# Patient Record
Sex: MALE | Race: WHITE | Employment: FULL TIME | URBAN - NONMETROPOLITAN AREA
[De-identification: names, ages, dates, MRNs, and addresses within clinical notes are randomized per-mention and may not be internally consistent; named-entity substitution may affect disease eponyms.]

---

## 2019-03-15 DIAGNOSIS — I24.9 ACUTE CORONARY SYNDROME (HCC): ICD-10-CM

## 2019-03-15 DIAGNOSIS — I21.02 ST ELEVATION MYOCARDIAL INFARCTION INVOLVING LEFT ANTERIOR DESCENDING (LAD) CORONARY ARTERY (HCC): ICD-10-CM

## 2019-03-15 DIAGNOSIS — I46.9 CARDIAC ARREST (HCC): ICD-10-CM

## 2019-03-16 RX ORDER — CARVEDILOL 6.25 MG/1
TABLET ORAL
Qty: 30 TABLET | Refills: 0 | Status: SHIPPED | OUTPATIENT
Start: 2019-03-16 | End: 2019-04-16 | Stop reason: SDUPTHER

## 2019-04-16 ENCOUNTER — OFFICE VISIT (OUTPATIENT)
Dept: CARDIOLOGY CLINIC | Age: 54
End: 2019-04-16
Payer: COMMERCIAL

## 2019-04-16 VITALS
HEIGHT: 71 IN | WEIGHT: 238 LBS | RESPIRATION RATE: 18 BRPM | BODY MASS INDEX: 33.32 KG/M2 | DIASTOLIC BLOOD PRESSURE: 78 MMHG | HEART RATE: 79 BPM | SYSTOLIC BLOOD PRESSURE: 118 MMHG

## 2019-04-16 DIAGNOSIS — I46.9 CARDIAC ARREST (HCC): ICD-10-CM

## 2019-04-16 DIAGNOSIS — I21.02 ST ELEVATION MYOCARDIAL INFARCTION INVOLVING LEFT ANTERIOR DESCENDING (LAD) CORONARY ARTERY (HCC): ICD-10-CM

## 2019-04-16 DIAGNOSIS — I24.9 ACUTE CORONARY SYNDROME (HCC): Primary | ICD-10-CM

## 2019-04-16 PROCEDURE — 93000 ELECTROCARDIOGRAM COMPLETE: CPT | Performed by: INTERNAL MEDICINE

## 2019-04-16 PROCEDURE — 99214 OFFICE O/P EST MOD 30 MIN: CPT | Performed by: INTERNAL MEDICINE

## 2019-04-16 RX ORDER — CARVEDILOL 6.25 MG/1
TABLET ORAL
Qty: 30 TABLET | Refills: 0 | Status: SHIPPED | OUTPATIENT
Start: 2019-04-16 | End: 2020-01-17 | Stop reason: SDUPTHER

## 2019-04-16 RX ORDER — ATORVASTATIN CALCIUM 80 MG/1
TABLET, FILM COATED ORAL
Qty: 90 TABLET | Refills: 3 | Status: SHIPPED
Start: 2019-04-16 | End: 2020-04-23 | Stop reason: SDUPTHER

## 2019-04-16 RX ORDER — LISINOPRIL 5 MG/1
TABLET ORAL
Qty: 90 TABLET | Refills: 3 | Status: SHIPPED
Start: 2019-04-16 | End: 2020-04-23 | Stop reason: SDUPTHER

## 2019-04-16 NOTE — PROGRESS NOTES
 aspirin 81 MG chewable tablet CHEW AND SWALLOW ONE TABLET BY MOUTH EVERY DAY 90 tablet 3    Cholecalciferol (VITAMIN D) 2000 units CAPS capsule Take 2,000 Units by mouth daily       No current facility-administered medications for this visit. Social History     Socioeconomic History    Marital status:      Spouse name: Not on file    Number of children: Not on file    Years of education: Not on file    Highest education level: Not on file   Occupational History    Not on file   Social Needs    Financial resource strain: Not on file    Food insecurity:     Worry: Not on file     Inability: Not on file    Transportation needs:     Medical: Not on file     Non-medical: Not on file   Tobacco Use    Smoking status: Never Smoker    Smokeless tobacco: Never Used   Substance and Sexual Activity    Alcohol use: No     Alcohol/week: 0.0 oz     Comment: no alcohol x 10 years;  denies caffeine    Drug use: No    Sexual activity: Not on file   Lifestyle    Physical activity:     Days per week: Not on file     Minutes per session: Not on file    Stress: Not on file   Relationships    Social connections:     Talks on phone: Not on file     Gets together: Not on file     Attends Zoroastrian service: Not on file     Active member of club or organization: Not on file     Attends meetings of clubs or organizations: Not on file     Relationship status: Not on file    Intimate partner violence:     Fear of current or ex partner: Not on file     Emotionally abused: Not on file     Physically abused: Not on file     Forced sexual activity: Not on file   Other Topics Concern    Not on file   Social History Narrative    Not on file       Family History   Problem Relation Age of Onset    Heart Attack Father     High Blood Pressure Father     Emphysema Mother        Review of Systems:  Heart: as above   Lungs: as above   Eyes: denies changes in vision or discharge.    Ears: denies changes in hearing or pain.   Nose: denies epistaxis or masses   Throat: denies sore throat or trouble swallowing. Neuro: denies numbness, tingling, tremors. Skin: denies rashes or itching. : denies hematuria, dysuria   GI: denies vomiting, diarrhea   Psych: denies mood changed, anxiety, depression. All other systems negative. Physical Exam   /78   Pulse 79   Resp 18   Ht 5' 11\" (1.803 m)   Wt 238 lb (108 kg)   BMI 33.19 kg/m²   Constitutional: Oriented to person, place, and time. Well-developed and well-nourished. No distress. Head: Normocephalic and atraumatic. Eyes: EOM are normal. Pupils are equal, round, and reactive to light. Neck: Normal range of motion. Neck supple. No hepatojugular reflux and no JVD present. Carotid bruit is not present. No tracheal deviation present. No thyromegaly present. Cardiovascular: Normal rate, regular rhythm, normal heart sounds and intact distal pulses. Exam reveals no gallop and no friction rub. No murmur heard. Pulmonary/Chest: Effort normal and breath sounds normal. No respiratory distress. No wheezes. No rales. No tenderness. Abdominal: Soft. Bowel sounds are normal. No distension and no mass. No tenderness. No rebound and no guarding. Musculoskeletal: Normal range of motion. No edema and no tenderness. Lymphadenopathy:   No cervical adenopathy. No groin adenopathy. Neurological: Alert and oriented to person, place, and time. Skin: Skin is warm and dry. No rash noted. Not diaphoretic. No erythema. Psychiatric: Normal mood and affect. Behavior is normal.     EKG:  normal EKG, normal sinus rhythm. ASSESSMENT AND PLAN:  Patient Active Problem List   Diagnosis    Acute coronary syndrome ([facility]    [facility]    Diabetes mellitus type 2, uncontrolled ([facility]    Cardiac arrest ([facility]    Presence of drug coated stent in anterior descending branch of left coronary artery     1. CAD:  ASA/BB/statin/ACEI. 2. Lipid: Statin.     3. DM: Per PCP.    Joan Lowery D.O.   Cardiologist  Cardiology, [facility]

## 2020-01-17 RX ORDER — CARVEDILOL 6.25 MG/1
TABLET ORAL
Qty: 30 TABLET | Refills: 0 | Status: SHIPPED
Start: 2020-01-17 | End: 2020-03-04

## 2020-03-04 RX ORDER — CARVEDILOL 6.25 MG/1
TABLET ORAL
Qty: 30 TABLET | Refills: 2 | Status: SHIPPED
Start: 2020-03-04 | End: 2020-04-02 | Stop reason: SDUPTHER

## 2020-04-02 RX ORDER — CARVEDILOL 6.25 MG/1
TABLET ORAL
Qty: 30 TABLET | Refills: 2 | Status: SHIPPED
Start: 2020-04-02 | End: 2020-04-23 | Stop reason: SDUPTHER

## 2020-04-23 ENCOUNTER — VIRTUAL VISIT (OUTPATIENT)
Dept: CARDIOLOGY CLINIC | Age: 55
End: 2020-04-23
Payer: COMMERCIAL

## 2020-04-23 VITALS
HEIGHT: 71 IN | WEIGHT: 240 LBS | SYSTOLIC BLOOD PRESSURE: 117 MMHG | BODY MASS INDEX: 33.6 KG/M2 | DIASTOLIC BLOOD PRESSURE: 77 MMHG

## 2020-04-23 PROCEDURE — 99442 PR PHYS/QHP TELEPHONE EVALUATION 11-20 MIN: CPT | Performed by: INTERNAL MEDICINE

## 2020-04-23 RX ORDER — ATORVASTATIN CALCIUM 80 MG/1
TABLET, FILM COATED ORAL
Qty: 90 TABLET | Refills: 3 | Status: SHIPPED
Start: 2020-04-23 | End: 2021-04-09

## 2020-04-23 RX ORDER — LISINOPRIL 5 MG/1
TABLET ORAL
Qty: 90 TABLET | Refills: 3 | Status: SHIPPED
Start: 2020-04-23 | End: 2021-04-09

## 2020-04-23 RX ORDER — CARVEDILOL 6.25 MG/1
TABLET ORAL
Qty: 90 TABLET | Refills: 3 | Status: SHIPPED
Start: 2020-04-23 | End: 2021-05-17

## 2021-04-09 DIAGNOSIS — I21.02 ST ELEVATION MYOCARDIAL INFARCTION INVOLVING LEFT ANTERIOR DESCENDING (LAD) CORONARY ARTERY (HCC): ICD-10-CM

## 2021-04-09 DIAGNOSIS — I24.9 ACUTE CORONARY SYNDROME (HCC): ICD-10-CM

## 2021-04-09 DIAGNOSIS — I46.9 CARDIAC ARREST (HCC): ICD-10-CM

## 2021-04-10 RX ORDER — LISINOPRIL 5 MG/1
TABLET ORAL
Qty: 90 TABLET | Refills: 0 | Status: SHIPPED
Start: 2021-04-10 | End: 2021-07-16

## 2021-04-10 RX ORDER — ATORVASTATIN CALCIUM 80 MG/1
TABLET, FILM COATED ORAL
Qty: 90 TABLET | Refills: 0 | Status: SHIPPED
Start: 2021-04-10 | End: 2021-07-16

## 2021-05-17 DIAGNOSIS — I21.02 ST ELEVATION MYOCARDIAL INFARCTION INVOLVING LEFT ANTERIOR DESCENDING (LAD) CORONARY ARTERY (HCC): ICD-10-CM

## 2021-05-17 DIAGNOSIS — I46.9 CARDIAC ARREST (HCC): ICD-10-CM

## 2021-05-17 DIAGNOSIS — I24.9 ACUTE CORONARY SYNDROME (HCC): ICD-10-CM

## 2021-05-17 RX ORDER — CARVEDILOL 6.25 MG/1
TABLET ORAL
Qty: 90 TABLET | Refills: 0 | Status: SHIPPED
Start: 2021-05-17 | End: 2021-07-28 | Stop reason: SDUPTHER

## 2021-07-16 DIAGNOSIS — I46.9 CARDIAC ARREST (HCC): ICD-10-CM

## 2021-07-16 DIAGNOSIS — I21.02 ST ELEVATION MYOCARDIAL INFARCTION INVOLVING LEFT ANTERIOR DESCENDING (LAD) CORONARY ARTERY (HCC): ICD-10-CM

## 2021-07-16 DIAGNOSIS — I24.9 ACUTE CORONARY SYNDROME (HCC): ICD-10-CM

## 2021-07-16 RX ORDER — ATORVASTATIN CALCIUM 80 MG/1
TABLET, FILM COATED ORAL
Qty: 90 TABLET | Refills: 0 | Status: SHIPPED
Start: 2021-07-16 | End: 2021-07-28 | Stop reason: SDUPTHER

## 2021-07-16 RX ORDER — LISINOPRIL 5 MG/1
TABLET ORAL
Qty: 90 TABLET | Refills: 0 | Status: SHIPPED
Start: 2021-07-16 | End: 2021-07-28 | Stop reason: SDUPTHER

## 2021-07-28 ENCOUNTER — OFFICE VISIT (OUTPATIENT)
Dept: CARDIOLOGY CLINIC | Age: 56
End: 2021-07-28
Payer: COMMERCIAL

## 2021-07-28 VITALS
HEIGHT: 71 IN | WEIGHT: 242 LBS | HEART RATE: 66 BPM | DIASTOLIC BLOOD PRESSURE: 76 MMHG | BODY MASS INDEX: 33.88 KG/M2 | SYSTOLIC BLOOD PRESSURE: 114 MMHG | RESPIRATION RATE: 18 BRPM

## 2021-07-28 DIAGNOSIS — I46.9 CARDIAC ARREST (HCC): ICD-10-CM

## 2021-07-28 DIAGNOSIS — I21.02 ST ELEVATION MYOCARDIAL INFARCTION INVOLVING LEFT ANTERIOR DESCENDING (LAD) CORONARY ARTERY (HCC): Primary | ICD-10-CM

## 2021-07-28 DIAGNOSIS — I24.9 ACUTE CORONARY SYNDROME (HCC): ICD-10-CM

## 2021-07-28 PROCEDURE — 93000 ELECTROCARDIOGRAM COMPLETE: CPT | Performed by: INTERNAL MEDICINE

## 2021-07-28 PROCEDURE — 99214 OFFICE O/P EST MOD 30 MIN: CPT | Performed by: INTERNAL MEDICINE

## 2021-07-28 RX ORDER — LISINOPRIL 5 MG/1
5 TABLET ORAL DAILY
Qty: 90 TABLET | Refills: 3 | Status: SHIPPED
Start: 2021-07-28 | End: 2022-10-10

## 2021-07-28 RX ORDER — CARVEDILOL 6.25 MG/1
6.25 TABLET ORAL 2 TIMES DAILY WITH MEALS
Qty: 90 TABLET | Refills: 3 | Status: SHIPPED
Start: 2021-07-28 | End: 2022-10-10

## 2021-07-28 RX ORDER — ATORVASTATIN CALCIUM 80 MG/1
80 TABLET, FILM COATED ORAL DAILY
Qty: 90 TABLET | Refills: 3 | Status: SHIPPED
Start: 2021-07-28 | End: 2022-10-10

## 2021-07-28 NOTE — PROGRESS NOTES
CHIEF COMPLAINT: CAD    HISTORY OF PRESENT ILLNESS: Patient is a 60 y.o. male seen at the request of Lee Flores MD.      No CP or SOB. Past history includes:  1. Obesity. BMI: 32.  2. No known drug allergies. 3. Never smoked. 4. No family history of premature CAD. 5. No history of hypertension, diabetes, CVA or TIA. 6. Presentation [facility] on 09/29/2015 with acute onset substernal chest pain and VF arrest. CPR followed by defibrillation x 3. Intubated in the ED. EKG concerning for acute anterolateral STEMI. 7. Emergent left heart catheterization, 9/29/2015, right groin approach. Left main 0%, LAD, mid 99% with HAIM 1 flow distally followed by 60% head bifurcation of D1; left circumflex, mild diffuse disease RCA dominant, 50-60% mild stenosis with mild disease in the RPDA and LPCA. LV angio, anterolateral and apical hypokinesis. EF 40-45%. LVEDP 16 mmHg. 8. PCI to the mid LAD with 4.0 x 18 mm Xience Alphine drug-eluting stent dilated with noncompliant balloon to 4.5 mm.  9. Echocardiogram 10/05/2015, Dr. Jamel Dumont. EF 65%. Stage 1 diastolic dysfunction. Otherwise, no significant valvulopathy.     Past Medical History:   Diagnosis Date    CAD (coronary artery disease)     [facility]     Diabetes mellitus ([facility]     Hyperlipidemia     STEMI (ST elevation myocardial infarction) ([facility]        Patient Active Problem List   Diagnosis    Acute coronary syndrome ([facility]    ST elevation MI (STEMI) ([facility]    Diabetes mellitus type 2, uncontrolled ([facility]    Cardiac arrest ([facility]    Presence of drug coated stent in anterior descending branch of left coronary artery       Allergies   Allergen Reactions    Vicodin [Hydrocodone-Acetaminophen]      Other reaction(s): Dizziness       Current Outpatient Medications   Medication Sig Dispense Refill    atorvastatin (LIPITOR) 80 MG tablet Take 1 tablet by mouth daily 90 tablet 3    lisinopril (PRINIVIL;ZESTRIL) 5 MG tablet Take 1 tablet by mouth daily 90 coated stent in anterior descending branch of left coronary artery     1. CAD:    ASA/BB/statin/ACEI. 2. Lipid: Statin. Jada Guevara D.O.   Cardiologist  Cardiology, [facility]

## 2022-09-16 NOTE — PROGRESS NOTES
9/20/22: New Pt here for Lt neck mass, noticed early May 2022. Grew to a \"baseball\" sized mass within 2 mo. Started to cause tingling in arm. Started on Amoxyl and swelling decreased. Mid August mass came to a head and began draining thin yellow fluid. No pain currently. No difficulty swallowing. No change in voice. Drinks 6-7 bottles of water, 1-2 cups of coffee, and no alcohol. Weight is stable.

## 2022-09-20 ENCOUNTER — OFFICE VISIT (OUTPATIENT)
Dept: ENT CLINIC | Age: 57
End: 2022-09-20
Payer: COMMERCIAL

## 2022-09-20 VITALS — WEIGHT: 236.8 LBS | HEIGHT: 71 IN | BODY MASS INDEX: 33.15 KG/M2

## 2022-09-20 DIAGNOSIS — K02.9 DENTAL CARIES: ICD-10-CM

## 2022-09-20 DIAGNOSIS — L02.11 NECK ABSCESS: ICD-10-CM

## 2022-09-20 DIAGNOSIS — R22.1 NECK MASS: Primary | ICD-10-CM

## 2022-09-20 PROCEDURE — 99204 OFFICE O/P NEW MOD 45 MIN: CPT | Performed by: OTOLARYNGOLOGY

## 2022-09-20 RX ORDER — PENICILLIN V POTASSIUM 250 MG/1
250 TABLET ORAL 4 TIMES DAILY
Qty: 120 TABLET | Refills: 0 | Status: SHIPPED | OUTPATIENT
Start: 2022-09-20 | End: 2022-10-20

## 2022-09-20 NOTE — LETTER
Dear MD Lexi Darden MD     We had the pleasure of seeing Ron Mccain, 1965 here    on 9/20/2022  Please see below for review of care and plans. Chief complaint-     ICD-10-CM    1. Neck mass  R22.1       2. Dental caries  K02.9 External Referral To Dentistry      3. Neck abscess  L02.11             History of Present Illness- Presents for evaluation of left neck mass. Patient reports onset of 5/2022 of left sided neck mass that progressively enlarged over time to significant size. Not associated with pain. First waited about 2 months as it continued to grow, was seen at [place], CT scan ordered and started on cleocin without improvement. Was then seen by Dr Delilah Muir, [facility] [place], who did CT neck 7/15/22 that showed multiloculated cystic mass left level II, FNA and aspirated 15cc of fluid that was negative for organism, no tissue diagnosis. Then given Augmentin in which the mass came to a head with white purulence and then auto expressed and had yellow tinged drainage. Since it self drained the mass is significantly decreased in size and has not re accumulated. Last took antibiotics 2 weeks ago. He now has residual seeping from the previous drainage site but no further swelling like first onset. Denies weight loss, fever, chills ,night sweats, change in voice. Nonsmoker, No etoh use. Denies recent dental work but has many dental caries that need addressed. Review of Systems- No drainage, discharge, or headache. Complete 10 system ROS completed and negative except as noted above. Physical Examination-   Vital Signs-Ht 5' 11\" (1.803 m)   Wt 236 lb 12.8 oz (107.4 kg)   BMI 33.03 kg/m²     Ears- Tympanic membranes clear bilaterally. No middle ear effusion. No pre or post auricular tenderness. Nose- Nasal mucosa clear and dry. No significant septal deviation or inferior turbinate hypertrophy. Oral Cavity/Oropharynx- Floor of mouth and tongue are soft and nontender.   No posterior pharyngeal erythema. + gag reflex. Left mandibular broken teeth w/ root remaining, dental caries. Neck- Soft and nontender. left level IIA along border of SCM small fistulization with minimal purulence expressed, with surrounding induation. No masses, lesions, lymphadenopathy, or thyroid nodules appreciated. Cranial Nerve- Cranial nerves II to XII intact. Extraocular muscles intact. No gross motor visual deficits. No spontaneous nystagmus. Face- No facial skin tenderness to palpation. Heart- No cyanosis, regular  Lungs- No stridor, no intercostal accessory muscle use  General- The patient is in no acute distress. A&O x3      Medical Decision Making and Treatment Plan. 1. Hx of neck abscess that autodrained with improvement- differential includes abscess from dental origin, infected branchial cleft cyst, less likely malignancy   2. Discussed he needs to be evaluated by dentist for evaluation of dental extractions, referral placed  3. Asked pt to get the CD of his previous CT scans and bring to office for review   4. If persists after dental evaluation, will consider repeat CT scan at next visit   5. Will start on PCN V for possible actinomyces/dental infection  6. Follow up in 4-6 weeks for reevaluation, discussed signs na symptoms on which to return sooner  7. Will scope at next visit     Thank you for the opportunity to take part in the care of this very pleasant patient, Raúl Watson  Sincerely,            Estuardo Sherman.  Beverly Lanes, M.D., Ph.D., [address] [facility]   Department of Otolaryngology-Head and Neck Surgery  Chief of Head & Neck Surgical Oncology  Director Head & Neck Oncology Service Line

## 2022-09-20 NOTE — PROGRESS NOTES
Dear MD Paulo Anderson MD     We had the pleasure of seeing Alejandra Santiago, 1965 here    on 9/20/2022  Please see below for review of care and plans. Chief complaint-     ICD-10-CM    1. Neck mass  R22.1       2. Dental caries  K02.9 External Referral To Dentistry      3. Neck abscess  L02.11             History of Present Illness- Presents for evaluation of left neck mass. Patient reports onset of 5/2022 of left sided neck mass that progressively enlarged over time to significant size. Not associated with pain. First waited about 2 months as it continued to grow, was seen at [place], CT scan ordered and started on cleocin without improvement. Was then seen by Dr Afshan Savage, ENT, [place], who did CT neck 7/15/22 that showed multiloculated cystic mass left level II, FNA and aspirated 15cc of fluid that was negative for organism, no tissue diagnosis. Then given Augmentin in which the mass came to a head with white purulence and then auto expressed and had yellow tinged drainage. Since it self drained the mass is significantly decreased in size and has not re accumulated. Last took antibiotics 2 weeks ago. He now has residual seeping from the previous drainage site but no further swelling like first onset. Denies weight loss, fever, chills ,night sweats, change in voice. Nonsmoker, No etoh use. Denies recent dental work but has many dental caries that need addressed. Review of Systems- No drainage, discharge, or headache. Complete 10 system ROS completed and negative except as noted above. Physical Examination-   Vital Signs-Ht 5' 11\" (1.803 m)   Wt 236 lb 12.8 oz (107.4 kg)   BMI 33.03 kg/m²     Ears- Tympanic membranes clear bilaterally. No middle ear effusion. No pre or post auricular tenderness. Nose- Nasal mucosa clear and dry. No significant septal deviation or inferior turbinate hypertrophy. Oral Cavity/Oropharynx- Floor of mouth and tongue are soft and nontender. No posterior pharyngeal erythema. + gag reflex. Left mandibular broken teeth w/ root remaining, dental caries. Neck- Soft and nontender. left level IIA along border of SCM small fistulization with minimal purulence expressed, with surrounding induation. No masses, lesions, lymphadenopathy, or thyroid nodules appreciated. Cranial Nerve- Cranial nerves II to XII intact. Extraocular muscles intact. No gross motor visual deficits. No spontaneous nystagmus. Face- No facial skin tenderness to palpation. Heart- No cyanosis, regular  Lungs- No stridor, no intercostal accessory muscle use  General- The patient is in no acute distress. A&O x3      Medical Decision Making and Treatment Plan. Hx of neck abscess that autodrained with improvement- differential includes abscess from dental origin, infected branchial cleft cyst, less likely malignancy   Discussed he needs to be evaluated by dentist for evaluation of dental extractions, referral placed  Asked pt to get the CD of his previous CT scans and bring to office for review   If persists after dental evaluation, will consider repeat CT scan at next visit   Will start on PCN V for possible actinomyces/dental infection  Follow up in 4-6 weeks for reevaluation, discussed signs na symptoms on which to return sooner  Will scope at next visit       I spent 45 minutes with the patients care and >50% of this time on counseling or coordinating care    Thank you for the opportunity to take part in the care of this very pleasant patient, Venita Giles  Sincerely,            Clemencia Reyes.  Bernard Ocasio M.D., Ph.D., [facility]   Department of Otolaryngology-Head and Neck Surgery  Chief of Head & Neck Surgical Oncology  Director Head & Neck Oncology Service Line

## 2022-10-08 DIAGNOSIS — I46.9 CARDIAC ARREST (HCC): ICD-10-CM

## 2022-10-08 DIAGNOSIS — I24.9 ACUTE CORONARY SYNDROME (HCC): ICD-10-CM

## 2022-10-08 DIAGNOSIS — I21.02 ST ELEVATION MYOCARDIAL INFARCTION INVOLVING LEFT ANTERIOR DESCENDING (LAD) CORONARY ARTERY (HCC): ICD-10-CM

## 2022-10-10 RX ORDER — ATORVASTATIN CALCIUM 80 MG/1
TABLET, FILM COATED ORAL
Qty: 90 TABLET | Refills: 0 | Status: SHIPPED | OUTPATIENT
Start: 2022-10-10

## 2022-10-10 RX ORDER — CARVEDILOL 6.25 MG/1
TABLET ORAL
Qty: 180 TABLET | Refills: 0 | Status: SHIPPED | OUTPATIENT
Start: 2022-10-10

## 2022-10-10 RX ORDER — LISINOPRIL 5 MG/1
TABLET ORAL
Qty: 90 TABLET | Refills: 0 | Status: SHIPPED | OUTPATIENT
Start: 2022-10-10

## 2022-11-07 ENCOUNTER — TELEPHONE (OUTPATIENT)
Dept: ENT CLINIC | Age: 57
End: 2022-11-07

## 2022-11-07 NOTE — TELEPHONE ENCOUNTER
Patient called in he is not feeling well and going to the hospital..    Patient canceled his appt on 11/8/22    Called patient back and LM to return call to reschedule

## 2022-11-30 NOTE — TELEPHONE ENCOUNTER
Patient returned call states that he is trying to find someone to give him a ride to the appt.   Will call back to schedule an appt

## 2022-12-14 NOTE — PROGRESS NOTES
Dear Dr Chiqui Gilbert MD No ref. provider found     We had the pleasure of seeing Rob Candelario, 1965 here    on 12/15/2022  Please see below for review of care and plans. Chief complaint-   No diagnosis found. History of Present Illness- Presents for evaluation of left neck mass. Patient reports onset of 5/2022 of left sided neck mass that progressively enlarged over time to significant size. Not associated with pain. First waited about 2 months as it continued to grow, was seen at [facility], CT scan ordered and started on cleocin without improvement. Was then seen by Dr MAYNARD, ENT, [place], who did CT neck 7/15/22 that showed multiloculated cystic mass left level II, FNA and aspirated 15cc of fluid that was negative for organism, no tissue diagnosis. Then given Augmentin in which the mass came to a head with white purulence and then auto expressed and had yellow tinged drainage. Since it self drained the mass is significantly decreased in size and has not re accumulated. Last took antibiotics 2 weeks ago. He now has residual seeping from the previous drainage site but no further swelling like first onset. Denies weight loss, fever, chills ,night sweats, change in voice. Nonsmoker, No etoh use. Denies recent dental work but has many dental caries that need addressed. 12/15/22: Pt here for 6 wk f/u neck mass after antibiotics. Notes less swelling since starting antibiotics. No pain. No difficulty swallowing. No change in voice. Drinks 4-5 bottles of water, 1-2 cups of coffee, and no alcohol. Weight is stable. Review of Systems- No drainage, discharge, or headache. Complete 10 system ROS completed and negative except as noted above. Physical Examination-   Vital Signs-Ht 5' 11\" (1.803 m)   Wt 236 lb 14.4 oz (107.5 kg)   BMI 33.04 kg/m²     Ears- Tympanic membranes clear bilaterally. No middle ear effusion. No pre or post auricular tenderness. Nose- Nasal mucosa clear and dry. No significant septal deviation or inferior turbinate hypertrophy. Oral Cavity/Oropharynx- Floor of mouth and tongue are soft and nontender. No posterior pharyngeal erythema. + gag reflex. Left mandibular broken teeth w/ root remaining, dental caries. Neck- Soft and nontender. left level IIA along border of SCM small fistulization with minimal purulence expressed, with surrounding induation. No masses, lesions, lymphadenopathy, or thyroid nodules appreciated. Cranial Nerve- Cranial nerves II to XII intact. Extraocular muscles intact. No gross motor visual deficits. No spontaneous nystagmus. Face- No facial skin tenderness to palpation. Heart- No cyanosis, regular  Lungs- No stridor, no intercostal accessory muscle use  General- The patient is in no acute distress. A&O x3    enttbfna   Procedure- fna-   Procedure in Detail: The patient was consented for a fna for the indication of a mass. no local was injected into area. 25 gauge needle was used to make multiple stab passes into lesion. A second specimen was collected. each one was sprayed onto a slide, with a rinse out in cytolyte. Slides fixed and sent for permanent pathollogical analysis. Pt tolerated well. Medical Decision Making and Treatment Plan.   Hx of neck abscess that autodrained with improvement- differential includes abscess from dental origin, infected branchial cleft cyst, less likely malignancy   Discussed he needs to be evaluated by dentist for evaluation of dental extractions, referral placed  Asked pt to get the CD of his previous CT scans and bring to office for review   If persists after dental evaluation, will consider repeat CT scan at next visit   Will start on PCN V for possible actinomyces/dental infection  Follow up in 4-6 weeks for reevaluation, discussed signs na symptoms on which to return sooner  may scope at next visit with path review      I spent 45 minutes with the patients care and >50% of this time on counseling or coordinating care    Thank you for the opportunity to take part in the care of this very pleasant patient, Afshan Christie  Sincerely,            Carmella Garcia.  Yaw Lomas M.D., Ph.D., [address] [place] [address]   Department of Otolaryngology-Head and Neck Surgery  Chief of Head & Neck Surgical Oncology  Director Head & Neck Oncology Service Line

## 2022-12-15 ENCOUNTER — OFFICE VISIT (OUTPATIENT)
Dept: ENT CLINIC | Age: 57
End: 2022-12-15
Payer: OTHER GOVERNMENT

## 2022-12-15 VITALS
HEIGHT: 71 IN | SYSTOLIC BLOOD PRESSURE: 120 MMHG | HEART RATE: 80 BPM | BODY MASS INDEX: 33.17 KG/M2 | DIASTOLIC BLOOD PRESSURE: 84 MMHG | WEIGHT: 236.9 LBS

## 2022-12-15 DIAGNOSIS — K02.9 DENTAL CARIES: ICD-10-CM

## 2022-12-15 DIAGNOSIS — R22.1 MASS OF LEFT SIDE OF NECK: ICD-10-CM

## 2022-12-15 DIAGNOSIS — L08.9 NECK INFECTION: Primary | ICD-10-CM

## 2022-12-15 PROCEDURE — 99213 OFFICE O/P EST LOW 20 MIN: CPT | Performed by: OTOLARYNGOLOGY

## 2022-12-15 PROCEDURE — 10021 FNA BX W/O IMG GDN 1ST LES: CPT | Performed by: OTOLARYNGOLOGY

## 2022-12-28 NOTE — PROGRESS NOTES
Dear MD Dr Naya Stearns MD    We had the pleasure of seeing Luis Valderrama, 1965 here    on 1/3/2023  Please see below for review of care and plans. Chief complaint-     ICD-10-CM    1. Neck mass  R22.1       2. Neck abscess  L02.11               History of Present Illness- Presents for evaluation of left neck mass. Patient reports onset of 5/2022 of left sided neck mass that progressively enlarged over time to significant size. Not associated with pain. First waited about 2 months as it continued to grow, was seen at [place], CT scan ordered and started on cleocin without improvement. Was then seen by Dr Billy Owusu, ENT, [place], who did CT neck 7/15/22 that showed multiloculated cystic mass left level II, FNA and aspirated 15cc of fluid that was negative for organism, no tissue diagnosis. Then given Augmentin in which the mass came to a head with white purulence and then auto expressed and had yellow tinged drainage. Since it self drained the mass is significantly decreased in size and has not re accumulated. Last took antibiotics 2 weeks ago. He now has residual seeping from the previous drainage site but no further swelling like first onset. Denies weight loss, fever, chills ,night sweats, change in voice. Nonsmoker, No etoh use. Denies recent dental work but has many dental caries that need addressed. 12/15/22: Pt here for 6 wk f/u neck mass after antibiotics. Notes less swelling since starting antibiotics. No pain. No difficulty swallowing. No change in voice. Drinks 4-5 bottles of water, 1-2 cups of coffee, and no alcohol. Weight is stable. 1/3/23: Pt here for FNA results. No pain. No difficulty swallowing. No change in voice. Drinks 4-5 bottles of water, 1-2 cups of coffee, and no alcohol. Weight is stable. Review of Systems- No drainage, discharge, or headache. Complete 10 system ROS completed and negative except as noted above.      Physical Examination-   Vital Signs-/86   Pulse 76   Ht 5' 11\" (1.803 m)   Wt 243 lb 1.6 oz (110.3 kg)   BMI 33.91 kg/m²     Ears- Tympanic membranes clear bilaterally. No middle ear effusion. No pre or post auricular tenderness. Nose- Nasal mucosa clear and dry. No significant septal deviation or inferior turbinate hypertrophy. Oral Cavity/Oropharynx- Floor of mouth and tongue are soft and nontender. No posterior pharyngeal erythema. + gag reflex. Left mandibular broken teeth w/ root remaining, dental caries. Neck- Soft and nontender. left level IIA along border of SCM small fistulization with minimal purulence expressed, with surrounding induation. No masses, lesions, lymphadenopathy, or thyroid nodules appreciated. Cranial Nerve- Cranial nerves II to XII intact. Extraocular muscles intact. No gross motor visual deficits. No spontaneous nystagmus. Face- No facial skin tenderness to palpation. Heart- No cyanosis, regular  Lungs- No stridor, no intercostal accessory muscle use  General- The patient is in no acute distress. A&O x3    enttbfna   Procedure- fna- previous  Procedure in Detail: The patient was consented for a fna for the indication of a mass. no local was injected into area. 25 gauge needle was used to make multiple stab passes into lesion. A second specimen was collected. each one was sprayed onto a slide, with a rinse out in cytolyte. Slides fixed and sent for permanent pathollogical analysis. Pt tolerated well. Medical Decision Making and Treatment Plan.   Hx of neck abscess that autodrained with improvement- differential includes abscess from dental origin, infected branchial cleft cyst, less likely malignancy   Discussed he needs to be evaluated by dentist for evaluation of dental extractions, referral placed  Asked pt to get the CD of his previous CT scans and bring to office for review   If persists after dental evaluation, will consider repeat CT scan at next visit   Will start on PCN V for possible actinomyces/dental infection- done, no more needed  Follow up in 4-6 weeks for re-evaluation, discussed signs and symptoms on which to return sooner- still with mass present. Not draining anymore  D/w pt and family regarding txment of neck mass and concern if just slowly resolving abscess vs an occult malignancy. Surgical excision could be an option to just remove it to eval for occult malignancy vs removal of abscess walled off. Pt wishes to think about his options and will further discuss at next visit. may scope at next visit         I spent 33 minutes with the patients care and >50% of this time on counseling or coordinating care    Thank you for the opportunity to take part in the care of this very pleasant patient, Karma Natarajan  Sincerely,            Joaquín Sims.  Ashly Ferguson M.D., Ph.D., [address] [facility]   Department of Otolaryngology-Head and Neck Surgery  Chief of Head & Neck Surgical Oncology  Director Head & Neck Oncology Service Line

## 2023-01-03 ENCOUNTER — OFFICE VISIT (OUTPATIENT)
Dept: ENT CLINIC | Age: 58
End: 2023-01-03
Payer: COMMERCIAL

## 2023-01-03 VITALS
HEART RATE: 76 BPM | BODY MASS INDEX: 34.03 KG/M2 | HEIGHT: 71 IN | SYSTOLIC BLOOD PRESSURE: 132 MMHG | WEIGHT: 243.1 LBS | DIASTOLIC BLOOD PRESSURE: 86 MMHG

## 2023-01-03 DIAGNOSIS — R22.1 NECK MASS: Primary | ICD-10-CM

## 2023-01-03 DIAGNOSIS — L02.11 NECK ABSCESS: ICD-10-CM

## 2023-01-03 PROCEDURE — 99214 OFFICE O/P EST MOD 30 MIN: CPT | Performed by: OTOLARYNGOLOGY

## 2023-01-03 NOTE — LETTER
Dear MD Dr Haley Heredia MD    We had the pleasure of seeing Elroy Clark, 1965 here    on 1/3/2023  Please see below for review of care and plans. Chief complaint-     ICD-10-CM    1. Neck mass  R22.1       2. Neck abscess  L02.11               History of Present Illness- Presents for evaluation of left neck mass. Patient reports onset of 5/2022 of left sided neck mass that progressively enlarged over time to significant size. Not associated with pain. First waited about 2 months as it continued to grow, was seen at [place], CT scan ordered and started on cleocin without improvement. Was then seen by Dr Isac Dunaway, ENT, [place], who did CT neck 7/15/22 that showed multiloculated cystic mass left level II, FNA and aspirated 15cc of fluid that was negative for organism, no tissue diagnosis. Then given Augmentin in which the mass came to a head with white purulence and then auto expressed and had yellow tinged drainage. Since it self drained the mass is significantly decreased in size and has not re accumulated. Last took antibiotics 2 weeks ago. He now has residual seeping from the previous drainage site but no further swelling like first onset. Denies weight loss, fever, chills ,night sweats, change in voice. Nonsmoker, No etoh use. Denies recent dental work but has many dental caries that need addressed. 12/15/22: Pt here for 6 wk f/u neck mass after antibiotics. Notes less swelling since starting antibiotics. No pain. No difficulty swallowing. No change in voice. Drinks 4-5 bottles of water, 1-2 cups of coffee, and no alcohol. Weight is stable. 1/3/23: Pt here for FNA results. No pain. No difficulty swallowing. No change in voice. Drinks 4-5 bottles of water, 1-2 cups of coffee, and no alcohol. Weight is stable. Review of Systems- No drainage, discharge, or headache. Complete 10 system ROS completed and negative except as noted above.      Physical Examination-   Vital Signs-/86   Pulse 76   Ht 5' 11\" (1.803 m)   Wt 243 lb 1.6 oz (110.3 kg)   BMI 33.91 kg/m²     Ears- Tympanic membranes clear bilaterally. No middle ear effusion. No pre or post auricular tenderness. Nose- Nasal mucosa clear and dry. No significant septal deviation or inferior turbinate hypertrophy. Oral Cavity/Oropharynx- Floor of mouth and tongue are soft and nontender. No posterior pharyngeal erythema. + gag reflex. Left mandibular broken teeth w/ root remaining, dental caries. Neck- Soft and nontender. left level IIA along border of SCM small fistulization with minimal purulence expressed, with surrounding induation. No masses, lesions, lymphadenopathy, or thyroid nodules appreciated. Cranial Nerve- Cranial nerves II to XII intact. Extraocular muscles intact. No gross motor visual deficits. No spontaneous nystagmus. Face- No facial skin tenderness to palpation. Heart- No cyanosis, regular  Lungs- No stridor, no intercostal accessory muscle use  General- The patient is in no acute distress. A&O x3    enttbfna   Procedure- fna- previous  Procedure in Detail: The patient was consented for a fna for the indication of a mass. no local was injected into area. 25 gauge needle was used to make multiple stab passes into lesion. A second specimen was collected. each one was sprayed onto a slide, with a rinse out in cytolyte. Slides fixed and sent for permanent pathollogical analysis. Pt tolerated well. Medical Decision Making and Treatment Plan. 1. Hx of neck abscess that autodrained with improvement- differential includes abscess from dental origin, infected branchial cleft cyst, less likely malignancy   2. Discussed he needs to be evaluated by dentist for evaluation of dental extractions, referral placed  3. Asked pt to get the CD of his previous CT scans and bring to office for review   4. If persists after dental evaluation, will consider repeat CT scan at next visit   5.  Will start on PCN V for possible actinomyces/dental infection- done, no more needed  6. Follow up in 4-6 weeks for re-evaluation, discussed signs and symptoms on which to return sooner- still with mass present. Not draining anymore  7. D/w pt and family regarding txment of neck mass and concern if just slowly resolving abscess vs an occult malignancy. Surgical excision could be an option to just remove it to eval for occult malignancy vs removal of abscess walled off.    8. Pt wishes to think about his options and will further discuss at next visit. 9. may scope at next visit   10. I spent 33 minutes with the patients care and >50% of this time on counseling or coordinating care    Thank you for the opportunity to take part in the care of this very pleasant patient, Manohar Alexus  Sincerely,            Mor Bangura M.D., Ph.D., [address] [facility]   Department of Otolaryngology-Head and Neck Surgery  Chief of Head & Neck Surgical Oncology  Director Head & Neck Oncology Service Line

## 2023-01-17 DIAGNOSIS — I21.02 ST ELEVATION MYOCARDIAL INFARCTION INVOLVING LEFT ANTERIOR DESCENDING (LAD) CORONARY ARTERY (HCC): ICD-10-CM

## 2023-01-17 DIAGNOSIS — I24.9 ACUTE CORONARY SYNDROME (HCC): ICD-10-CM

## 2023-01-17 DIAGNOSIS — I46.9 CARDIAC ARREST (HCC): ICD-10-CM

## 2023-01-17 RX ORDER — ATORVASTATIN CALCIUM 80 MG/1
TABLET, FILM COATED ORAL
Qty: 90 TABLET | Refills: 0 | Status: SHIPPED | OUTPATIENT
Start: 2023-01-17

## 2023-01-17 RX ORDER — LISINOPRIL 5 MG/1
TABLET ORAL
Qty: 90 TABLET | Refills: 0 | Status: SHIPPED | OUTPATIENT
Start: 2023-01-17

## 2023-02-20 NOTE — PROGRESS NOTES
2/21/23: Pt here to discuss surgery to remove benign neck mass. No pain, some swelling and tugging. No difficulty swallowing. No change in voice. Drinks 4-5 bottles of water, 1-2 cups of coffee, and no alcohol. Weight is stable.

## 2023-02-21 ENCOUNTER — OFFICE VISIT (OUTPATIENT)
Dept: ENT CLINIC | Age: 58
End: 2023-02-21
Payer: COMMERCIAL

## 2023-02-21 VITALS
WEIGHT: 240 LBS | SYSTOLIC BLOOD PRESSURE: 125 MMHG | DIASTOLIC BLOOD PRESSURE: 88 MMHG | BODY MASS INDEX: 33.6 KG/M2 | HEART RATE: 72 BPM | HEIGHT: 71 IN

## 2023-02-21 DIAGNOSIS — K14.8 TONGUE MASS: ICD-10-CM

## 2023-02-21 DIAGNOSIS — Z01.812 PRE-OPERATIVE LABORATORY EXAMINATION: ICD-10-CM

## 2023-02-21 DIAGNOSIS — R22.1 NECK MASS: Primary | ICD-10-CM

## 2023-02-21 DIAGNOSIS — R22.1 NECK MASS: ICD-10-CM

## 2023-02-21 LAB
BUN BLDV-MCNC: 11 MG/DL (ref 6–23)
CREAT SERPL-MCNC: 0.9 MG/DL (ref 0.7–1.2)
GFR SERPL CREATININE-BSD FRML MDRD: >60 ML/MIN/1.73

## 2023-02-21 PROCEDURE — 31575 DIAGNOSTIC LARYNGOSCOPY: CPT | Performed by: OTOLARYNGOLOGY

## 2023-02-21 PROCEDURE — 99214 OFFICE O/P EST MOD 30 MIN: CPT | Performed by: OTOLARYNGOLOGY

## 2023-02-21 NOTE — PROGRESS NOTES
Dear MD Dr Jodi Nguyen MD    We had the pleasure of seeing Tavares Castro, 1965 here    on 2/21/2023  Please see below for review of care and plans. Chief complaint-     ICD-10-CM    1. Pre-operative laboratory examination  Z01.812 BUN     Creatinine      2. Neck mass  R22.1 CT SOFT TISSUE NECK W CONTRAST     BUN     Creatinine      3. Tongue mass  K14.8 CT SOFT TISSUE NECK W CONTRAST     BUN     Creatinine              History of Present Illness- Presents for evaluation of left neck mass. Patient reports onset of 5/2022 of left sided neck mass that progressively enlarged over time to significant size. Not associated with pain. First waited about 2 months as it continued to grow, was seen at [place], CT scan ordered and started on cleocin without improvement. Was then seen by Dr Jamila Redd, ENT, [place], who did CT neck 7/15/22 that showed multiloculated cystic mass left level II, FNA and aspirated 15cc of fluid that was negative for organism, no tissue diagnosis. Then given Augmentin in which the mass came to a head with white purulence and then auto expressed and had yellow tinged drainage. Since it self drained the mass is significantly decreased in size and has not re accumulated. Last took antibiotics 2 weeks ago. He now has residual seeping from the previous drainage site but no further swelling like first onset. Denies weight loss, fever, chills ,night sweats, change in voice. Nonsmoker, No etoh use. Denies recent dental work but has many dental caries that need addressed. 12/15/22: Pt here for 6 wk f/u neck mass after antibiotics. Notes less swelling since starting antibiotics. No pain. No difficulty swallowing. No change in voice. Drinks 4-5 bottles of water, 1-2 cups of coffee, and no alcohol. Weight is stable. 1/3/23: Pt here for FNA results. No pain. No difficulty swallowing. No change in voice. Drinks 4-5 bottles of water, 1-2 cups of coffee, and no alcohol.  Weight is stable. 2/21/23: Pt here to discuss surgery to remove benign neck mass. No pain, some swelling and tugging. No difficulty swallowing. No change in voice. Drinks 4-5 bottles of water, 1-2 cups of coffee, and no alcohol. Weight is stable. Still there and painful at times. No draining for awhile. No other new lumps or bumps but worried about mass till being in his neck. Review of Systems- No drainage, discharge, or headache. Complete 10 system ROS completed and negative except as noted above. Physical Examination-   Vital Signs-/88   Pulse 72   Ht 5' 11\" (1.803 m)   Wt 240 lb (108.9 kg)   BMI 33.47 kg/m²     Ears- Tympanic membranes clear bilaterally. No middle ear effusion. No pre or post auricular tenderness. Nose- Nasal mucosa clear and dry. No significant septal deviation or inferior turbinate hypertrophy. Oral Cavity/Oropharynx- Floor of mouth and tongue are soft and nontender. No posterior pharyngeal erythema. + gag reflex. Left mandibular broken teeth w/ root remaining, dental caries. Neck- Soft and nontender on right. originally- left level IIA along border of SCM small fistulization with minimal purulence expressed, with surrounding induration. Transitioned to being a bit larger and more firm but no more drainage or fistulation. No masses, lesions, lymphadenopathy, or thyroid nodules appreciated. Cranial Nerve- Cranial nerves II to XII intact. Extraocular muscles intact. No gross motor visual deficits. No spontaneous nystagmus. Face- No facial skin tenderness to palpation. Heart- No cyanosis, regular  Lungs- No stridor, no intercostal accessory muscle use  General- The patient is in no acute distress. A&O x3    enttbfna   Procedure- fna- previous  Procedure in Detail: The patient was consented for a fna for the indication of a mass. no local was injected into area. 25 gauge needle was used to make multiple stab passes into lesion. A second specimen was collected. each one was sprayed onto a slide, with a rinse out in cytolyte. Slides fixed and sent for permanent pathollogical analysis. Pt tolerated well. Scope  Procedure note- after pt verbally consented, was sprayed nasally with 1:1 neosynephrine and xylocaine. Scope was passed and found nasal cavity with no lesion. np clear, tonsil wnl, tongue mobile and no masses but fullness on left side of bot at lingual tonsil area- soft to palpation. , vocal cords mobile bilaterally with full ab and ad duction. Hypopharynx clear, open and no masses. Medical Decision Making and Treatment Plan. Hx of neck abscess that autodrained with improvement- differential includes abscess from dental origin, infected branchial cleft cyst, less likely malignancy   Discussed he needs to be evaluated by dentist for evaluation of dental extractions, referral placed  Asked pt to get the CD of his previous CT scans and bring to office for review   If persists after dental evaluation, will consider repeat CT scan at next visit   Will start on PCN V for possible actinomyces/dental infection- done, no more needed  Follow up in 4-6 weeks for re-evaluation, discussed signs and symptoms on which to return sooner- still with mass present. Not draining anymore  D/w pt and family regarding txment of neck mass and concern if just slowly resolving abscess vs an occult malignancy. Surgical excision could be an option to just remove it to eval for occult malignancy vs removal of abscess walled off. Pt wishes to think about his options and will further discuss at next visit. Pt wishes for it to be removed as it is painful and worried about recurrance or mallignancy. may scope at next visit - done- fullness of left bot  Ct neck with contrast ordered to better eval mass of tongue and any changes in neck to plan for surgery- extent of surgery and whether panendo with tongue biopsy would be warranted.       I spent 33 minutes with the patients care and >50% of this time on counseling or coordinating care    Thank you for the opportunity to take part in the care of this very pleasant patient, Abby Ramirez  Sincerely,            Early Blizzard.  Arun Aldrich M.D., Ph.D., [address] [place] [address]   Department of Otolaryngology-Head and Neck Surgery  Chief of Head & Neck Surgical Oncology  Director Head & Neck Oncology Service Line

## 2023-03-06 ENCOUNTER — TELEPHONE (OUTPATIENT)
Dept: ENT CLINIC | Age: 58
End: 2023-03-06

## 2023-03-06 NOTE — TELEPHONE ENCOUNTER
Patient called this morning states that he does not want to have surgery, so he is not going to get the ct scan done either.

## 2023-03-23 ENCOUNTER — OFFICE VISIT (OUTPATIENT)
Dept: CARDIOLOGY CLINIC | Age: 58
End: 2023-03-23
Payer: COMMERCIAL

## 2023-03-23 VITALS
HEIGHT: 71 IN | DIASTOLIC BLOOD PRESSURE: 91 MMHG | HEART RATE: 69 BPM | SYSTOLIC BLOOD PRESSURE: 122 MMHG | BODY MASS INDEX: 33.6 KG/M2 | RESPIRATION RATE: 18 BRPM | WEIGHT: 240 LBS

## 2023-03-23 DIAGNOSIS — I24.9 ACUTE CORONARY SYNDROME (HCC): ICD-10-CM

## 2023-03-23 DIAGNOSIS — I21.02 ST ELEVATION MYOCARDIAL INFARCTION INVOLVING LEFT ANTERIOR DESCENDING (LAD) CORONARY ARTERY (HCC): Primary | ICD-10-CM

## 2023-03-23 DIAGNOSIS — I46.9 CARDIAC ARREST (HCC): ICD-10-CM

## 2023-03-23 PROBLEM — R42 DIZZINESS AND GIDDINESS: Status: ACTIVE | Noted: 2021-11-05

## 2023-03-23 PROBLEM — E78.5 HYPERLIPIDEMIA: Status: ACTIVE | Noted: 2023-03-23

## 2023-03-23 PROBLEM — I65.23 OCCLUSION AND STENOSIS OF BILATERAL CAROTID ARTERIES: Status: ACTIVE | Noted: 2022-05-09

## 2023-03-23 PROBLEM — K21.9 GASTROESOPHAGEAL REFLUX DISEASE: Status: ACTIVE | Noted: 2023-03-23

## 2023-03-23 PROBLEM — I25.10 ATHEROSCLEROTIC HEART DISEASE OF NATIVE CORONARY ARTERY WITHOUT ANGINA PECTORIS: Status: ACTIVE | Noted: 2023-03-23

## 2023-03-23 PROBLEM — R22.1 LOCALIZED SWELLING, MASS AND LUMP, NECK: Status: ACTIVE | Noted: 2022-04-20

## 2023-03-23 PROBLEM — R59.0 LOCALIZED ENLARGED LYMPH NODES: Status: ACTIVE | Noted: 2022-12-10

## 2023-03-23 PROBLEM — R19.5 OCCULT BLOOD IN STOOLS: Status: ACTIVE | Noted: 2023-03-23

## 2023-03-23 PROBLEM — F41.1 ANXIETY STATE: Status: ACTIVE | Noted: 2023-03-23

## 2023-03-23 PROBLEM — R22.1 SUBCUTANEOUS MASS OF NECK: Status: ACTIVE | Noted: 2023-03-23

## 2023-03-23 PROBLEM — R53.83 OTHER FATIGUE: Status: ACTIVE | Noted: 2021-11-05

## 2023-03-23 PROBLEM — H25.813 COMBINED FORMS OF AGE-RELATED CATARACT, BILATERAL: Status: ACTIVE | Noted: 2023-03-23

## 2023-03-23 PROBLEM — I10 BENIGN ESSENTIAL HYPERTENSION: Status: ACTIVE | Noted: 2023-03-23

## 2023-03-23 PROBLEM — E78.00 PURE HYPERCHOLESTEROLEMIA: Status: ACTIVE | Noted: 2023-03-23

## 2023-03-23 PROCEDURE — 3074F SYST BP LT 130 MM HG: CPT | Performed by: INTERNAL MEDICINE

## 2023-03-23 PROCEDURE — 93000 ELECTROCARDIOGRAM COMPLETE: CPT | Performed by: INTERNAL MEDICINE

## 2023-03-23 PROCEDURE — 3078F DIAST BP <80 MM HG: CPT | Performed by: INTERNAL MEDICINE

## 2023-03-23 PROCEDURE — 99214 OFFICE O/P EST MOD 30 MIN: CPT | Performed by: INTERNAL MEDICINE

## 2023-03-23 RX ORDER — ATORVASTATIN CALCIUM 80 MG/1
TABLET, FILM COATED ORAL
Qty: 90 TABLET | Refills: 3 | Status: SHIPPED | OUTPATIENT
Start: 2023-03-23

## 2023-03-23 RX ORDER — LISINOPRIL 5 MG/1
TABLET ORAL
Qty: 90 TABLET | Refills: 3 | Status: SHIPPED | OUTPATIENT
Start: 2023-03-23

## 2023-03-23 RX ORDER — CARVEDILOL 3.12 MG/1
3.12 TABLET ORAL 2 TIMES DAILY WITH MEALS
Qty: 180 TABLET | Refills: 3 | Status: SHIPPED | OUTPATIENT
Start: 2023-03-23

## 2023-03-23 NOTE — PROGRESS NOTES
CHIEF COMPLAINT: CAD    HISTORY OF PRESENT ILLNESS: Patient is a 59 y.o. male seen at the request of Baron Madison MD.      No CP or SOB. Past history includes:  Obesity. BMI: 32.  No known drug allergies. Never smoked. No family history of premature CAD. No history of hypertension, diabetes, CVA or TIA. Presentation [facility] on 09/29/2015 with acute onset substernal chest pain and VF arrest. CPR followed by defibrillation x 3. Intubated in the ED. EKG concerning for acute anterolateral STEMI. Emergent left heart catheterization, 9/29/2015, right groin approach. Left main 0%, LAD, mid 99% with HAIM 1 flow distally followed by 60% head bifurcation of D1; left circumflex, mild diffuse disease RCA dominant, 50-60% mild stenosis with mild disease in the RPDA and LPCA. LV angio, anterolateral and apical hypokinesis. EF 40-45%. LVEDP 16 mmHg. PCI to the mid LAD with 4.0 x 18 mm Xience Alphine drug-eluting stent dilated with noncompliant balloon to 4.5 mm. Echocardiogram 10/05/2015, Dr. Stacy Rebollar. EF 65%. Stage 1 diastolic dysfunction. Otherwise, no significant valvulopathy.     Past Medical History:   Diagnosis Date    CAD (coronary artery disease)     Cardiac arrest ([facility]     Diabetes mellitus ([facility]     Hyperlipidemia     STEMI (ST elevation myocardial infarction) [facility])        Patient Active Problem List   Diagnosis    Acute coronary syndrome ([facility]    Old myocardial infarction    Diabetes mellitus type 2, uncontrolled    Cardiac arrest ([facility]    Presence of drug coated stent in anterior descending branch of left coronary artery    Anxiety state    Atherosclerotic heart disease of native coronary artery without angina pectoris    Benign essential hypertension    Combined forms of age-related cataract, bilateral    Dizziness and giddiness    Gastroesophageal reflux disease    Hyperlipidemia    Localized enlarged lymph nodes    Localized swelling, mass and lump, neck    Occlusion and stenosis of bilateral carotid

## 2023-04-20 ENCOUNTER — OFFICE VISIT (OUTPATIENT)
Dept: ENT CLINIC | Age: 58
End: 2023-04-20
Payer: COMMERCIAL

## 2023-04-20 ENCOUNTER — TELEPHONE (OUTPATIENT)
Dept: CARDIOLOGY CLINIC | Age: 58
End: 2023-04-20

## 2023-04-20 VITALS
HEART RATE: 77 BPM | BODY MASS INDEX: 33.04 KG/M2 | HEIGHT: 71 IN | SYSTOLIC BLOOD PRESSURE: 108 MMHG | WEIGHT: 236 LBS | DIASTOLIC BLOOD PRESSURE: 72 MMHG

## 2023-04-20 DIAGNOSIS — Z01.818 PRE-OP TESTING: ICD-10-CM

## 2023-04-20 DIAGNOSIS — I25.10 CORONARY ARTERY DISEASE INVOLVING NATIVE CORONARY ARTERY OF NATIVE HEART, UNSPECIFIED WHETHER ANGINA PRESENT: Primary | ICD-10-CM

## 2023-04-20 DIAGNOSIS — Q18.1: ICD-10-CM

## 2023-04-20 DIAGNOSIS — M54.2 NECK PAIN: ICD-10-CM

## 2023-04-20 DIAGNOSIS — R22.1 NECK MASS: Primary | ICD-10-CM

## 2023-04-20 PROCEDURE — 3078F DIAST BP <80 MM HG: CPT | Performed by: OTOLARYNGOLOGY

## 2023-04-20 PROCEDURE — 3074F SYST BP LT 130 MM HG: CPT | Performed by: OTOLARYNGOLOGY

## 2023-04-20 PROCEDURE — 99215 OFFICE O/P EST HI 40 MIN: CPT | Performed by: OTOLARYNGOLOGY

## 2023-04-20 NOTE — PATIENT INSTRUCTIONS
questions, you can reach them at [phone] (per Pre-Admission testing, EKG is required for all patients age 53+, have a diagnosis of hypertension, diabetes, or on dialysis). [address], [address], [facility] will call you a couple days prior to surgery and give you further instructions, if you have any questions, you can reach them at [phone] (per Pre-Admission testing, EKG is required for all patients age 53+, have a diagnosis of hypertension, diabetes, or on dialysis). [address] [facility] [facility] [place] will call you a couple days prior to surgery and give you further instructions, if you have any questions, you can reach them at [phone] (per Pre-Admission testing, EKG is required for all patients age 53+, have a diagnosis of hypertension, diabetes, or on dialysis).

## 2023-05-01 ENCOUNTER — PREP FOR PROCEDURE (OUTPATIENT)
Dept: ENT CLINIC | Age: 58
End: 2023-05-01

## 2023-05-01 ENCOUNTER — TELEPHONE (OUTPATIENT)
Dept: ENT CLINIC | Age: 58
End: 2023-05-01

## 2023-05-01 PROBLEM — Q18.1: Status: ACTIVE | Noted: 2023-05-01

## 2023-05-01 NOTE — TELEPHONE ENCOUNTER
Prior Authorization Form:      DEMOGRAPHICS:                     Patient Name:  Ron Lawson  Patient :  1965            Insurance:  Payor: [employer] / Plan: [address] [place] [address] / Product Type: *No Product type* /   Insurance ID Number:    Payer/Plan Subscr  Sex Relation Sub. Ins. ID Effective Group Num   1.  MEDICAL MUTUAVirgilio Ohm 1965 Male Self [id number]                                    [address] [id number]         DIAGNOSIS & PROCEDURE:                       Procedure/Operation: EXCISION LEFT NECK MASS, LEFT NECK DISSECTION, LEFT PAROTIDECTOMY, TONSILLECTOMY, DIRECT LARYNGOSCOPY           CPT Code: 92633, 29159, 01144, 19562, 18112    Diagnosis:  NECK MASS, FISTULA BETWEEN LEFT EAR AND NECK    ICD10 Code: R22.1, Q81.1    Location:  [facility]    Surgeon:  DR Brittni Prater INFORMATION:                          Date: 23    Time: N/A              Anesthesia:  General                                                       Status:  Inpatient        Special Comments:  PATHOLOGY       Electronically signed by Estela Yoder MA on 2023 at 1:59 PM

## 2023-05-03 LAB
LV EF: 66 %
LVEF MODALITY: NORMAL

## 2023-05-05 ENCOUNTER — TELEPHONE (OUTPATIENT)
Dept: CARDIOLOGY CLINIC | Age: 58
End: 2023-05-05

## 2023-05-05 DIAGNOSIS — Z01.818 PRE-OP TESTING: ICD-10-CM

## 2023-05-05 DIAGNOSIS — I25.10 CORONARY ARTERY DISEASE INVOLVING NATIVE CORONARY ARTERY OF NATIVE HEART, UNSPECIFIED WHETHER ANGINA PRESENT: ICD-10-CM

## 2023-05-05 NOTE — TELEPHONE ENCOUNTER
Called pt. And informed him stress test results were normal and if he has any further concerns to call the office and he agreed.

## 2023-05-05 NOTE — TELEPHONE ENCOUNTER
----- Message from [facility] DO Paige sent at 5/3/2023  1:04 PM EDT -----  Please notify patient that their stress results are normal.

## 2023-06-07 ENCOUNTER — HOSPITAL ENCOUNTER (OUTPATIENT)
Dept: PREADMISSION TESTING | Age: 58
Discharge: HOME | End: 2023-06-07
Payer: OTHER GOVERNMENT

## 2023-06-07 VITALS
OXYGEN SATURATION: 96 % | TEMPERATURE: 98 F | RESPIRATION RATE: 18 BRPM | WEIGHT: 240 LBS | DIASTOLIC BLOOD PRESSURE: 88 MMHG | HEIGHT: 71 IN | SYSTOLIC BLOOD PRESSURE: 135 MMHG | HEART RATE: 76 BPM | BODY MASS INDEX: 33.6 KG/M2

## 2023-06-07 DIAGNOSIS — Z01.812 PRE-OPERATIVE LABORATORY EXAMINATION: Primary | ICD-10-CM

## 2023-06-07 LAB
ABO + RH BLD: NORMAL
ANION GAP SERPL CALCULATED.3IONS-SCNC: 8 MMOL/L (ref 7–16)
BLD GP AB SCN SERPL QL: NORMAL
BUN SERPL-MCNC: 14 MG/DL (ref 6–23)
CALCIUM SERPL-MCNC: 9.7 MG/DL (ref 8.6–10.2)
CHLORIDE SERPL-SCNC: 100 MMOL/L (ref 98–107)
CO2 SERPL-SCNC: 28 MMOL/L (ref 22–29)
CREAT SERPL-MCNC: 0.9 MG/DL (ref 0.7–1.2)
ERYTHROCYTE [DISTWIDTH] IN BLOOD BY AUTOMATED COUNT: 13.5 FL (ref 11.5–15)
GLUCOSE SERPL-MCNC: 113 MG/DL (ref 74–99)
HCT VFR BLD AUTO: 44.6 % (ref 37–54)
HGB BLD-MCNC: 15.4 G/DL (ref 12.5–16.5)
MCH RBC QN AUTO: 32.1 PG (ref 26–35)
MCHC RBC AUTO-ENTMCNC: 34.5 % (ref 32–34.5)
MCV RBC AUTO: 92.9 FL (ref 80–99.9)
PLATELET # BLD AUTO: 261 E9/L (ref 130–450)
PMV BLD AUTO: 10.2 FL (ref 7–12)
POTASSIUM SERPL-SCNC: 5.1 MMOL/L (ref 3.5–5)
RBC # BLD AUTO: 4.8 E12/L (ref 3.8–5.8)
SODIUM SERPL-SCNC: 136 MMOL/L (ref 132–146)
WBC # BLD: 7.8 E9/L (ref 4.5–11.5)

## 2023-06-07 PROCEDURE — 86901 BLOOD TYPING SEROLOGIC RH(D): CPT

## 2023-06-07 PROCEDURE — 86850 RBC ANTIBODY SCREEN: CPT

## 2023-06-07 PROCEDURE — 80048 BASIC METABOLIC PNL TOTAL CA: CPT

## 2023-06-07 PROCEDURE — 36415 COLL VENOUS BLD VENIPUNCTURE: CPT

## 2023-06-07 PROCEDURE — 85027 COMPLETE CBC AUTOMATED: CPT

## 2023-06-07 PROCEDURE — 86900 BLOOD TYPING SEROLOGIC ABO: CPT

## 2023-06-12 ENCOUNTER — HOSPITAL ENCOUNTER (OUTPATIENT)
Age: 58
Setting detail: SURGERY ADMIT
Discharge: HOME | End: 2023-06-12
Attending: OTOLARYNGOLOGY | Admitting: OTOLARYNGOLOGY
Payer: OTHER GOVERNMENT

## 2023-06-12 VITALS
HEIGHT: 71 IN | TEMPERATURE: 97.3 F | RESPIRATION RATE: 19 BRPM | SYSTOLIC BLOOD PRESSURE: 130 MMHG | OXYGEN SATURATION: 91 % | BODY MASS INDEX: 33.6 KG/M2 | WEIGHT: 240 LBS | HEART RATE: 91 BPM | DIASTOLIC BLOOD PRESSURE: 76 MMHG

## 2023-06-12 DIAGNOSIS — Q18.1: ICD-10-CM

## 2023-06-12 DIAGNOSIS — G89.18 POST-OP PAIN: Primary | ICD-10-CM

## 2023-06-12 DIAGNOSIS — Z01.812 PRE-OPERATIVE LABORATORY EXAMINATION: ICD-10-CM

## 2023-06-12 DIAGNOSIS — R22.1 NECK MASS: ICD-10-CM

## 2023-06-12 LAB — METER GLUCOSE: 114 MG/DL (ref 74–99)

## 2023-06-12 PROCEDURE — 87186 SC STD MICRODIL/AGAR DIL: CPT

## 2023-06-12 PROCEDURE — 88307 TISSUE EXAM BY PATHOLOGIST: CPT

## 2023-06-12 PROCEDURE — 3700000001 HC ADD 15 MINUTES (ANESTHESIA): Performed by: OTOLARYNGOLOGY

## 2023-06-12 PROCEDURE — 88304 TISSUE EXAM BY PATHOLOGIST: CPT

## 2023-06-12 PROCEDURE — 3700000000 HC ANESTHESIA ATTENDED CARE: Performed by: OTOLARYNGOLOGY

## 2023-06-12 PROCEDURE — 3600000015 HC SURGERY LEVEL 5 ADDTL 15MIN: Performed by: OTOLARYNGOLOGY

## 2023-06-12 PROCEDURE — 7100000001 HC PACU RECOVERY - ADDTL 15 MIN: Performed by: OTOLARYNGOLOGY

## 2023-06-12 PROCEDURE — 87205 SMEAR GRAM STAIN: CPT

## 2023-06-12 PROCEDURE — 87070 CULTURE OTHR SPECIMN AEROBIC: CPT

## 2023-06-12 PROCEDURE — 3600000005 HC SURGERY LEVEL 5 BASE: Performed by: OTOLARYNGOLOGY

## 2023-06-12 PROCEDURE — 82962 GLUCOSE BLOOD TEST: CPT

## 2023-06-12 PROCEDURE — 87075 CULTR BACTERIA EXCEPT BLOOD: CPT

## 2023-06-12 PROCEDURE — 2709999900 HC NON-CHARGEABLE SUPPLY: Performed by: OTOLARYNGOLOGY

## 2023-06-12 PROCEDURE — 88360 TUMOR IMMUNOHISTOCHEM/MANUAL: CPT

## 2023-06-12 PROCEDURE — 88331 PATH CONSLTJ SURG 1 BLK 1SPC: CPT

## 2023-06-12 PROCEDURE — C1729 CATH, DRAINAGE: HCPCS | Performed by: OTOLARYNGOLOGY

## 2023-06-12 PROCEDURE — 2580000003 HC RX 258: Performed by: STUDENT IN AN ORGANIZED HEALTH CARE EDUCATION/TRAINING PROGRAM

## 2023-06-12 PROCEDURE — 7100000000 HC PACU RECOVERY - FIRST 15 MIN: Performed by: OTOLARYNGOLOGY

## 2023-06-12 PROCEDURE — 2720000010 HC SURG SUPPLY STERILE: Performed by: OTOLARYNGOLOGY

## 2023-06-12 RX ORDER — SODIUM CHLORIDE 0.9 % (FLUSH) 0.9 %
5-40 SYRINGE (ML) INJECTION EVERY 12 HOURS SCHEDULED
Status: DISCONTINUED | OUTPATIENT
Start: 2023-06-12 | End: 2023-06-12 | Stop reason: HOSPADM

## 2023-06-12 RX ORDER — SODIUM CHLORIDE 0.9 % (FLUSH) 0.9 %
5-40 SYRINGE (ML) INJECTION PRN
Status: DISCONTINUED | OUTPATIENT
Start: 2023-06-12 | End: 2023-06-12 | Stop reason: HOSPADM

## 2023-06-12 RX ORDER — SODIUM CHLORIDE 9 MG/ML
INJECTION, SOLUTION INTRAVENOUS PRN
Status: DISCONTINUED | OUTPATIENT
Start: 2023-06-12 | End: 2023-06-12 | Stop reason: HOSPADM

## 2023-06-12 RX ORDER — HYDROMORPHONE HYDROCHLORIDE 1 MG/ML
0.25 INJECTION, SOLUTION INTRAMUSCULAR; INTRAVENOUS; SUBCUTANEOUS EVERY 5 MIN PRN
Status: DISCONTINUED | OUTPATIENT
Start: 2023-06-12 | End: 2023-06-12 | Stop reason: HOSPADM

## 2023-06-12 RX ORDER — ONDANSETRON 4 MG/1
4 TABLET, FILM COATED ORAL 3 TIMES DAILY PRN
Qty: 15 TABLET | Refills: 0 | Status: SHIPPED | OUTPATIENT
Start: 2023-06-12

## 2023-06-12 RX ORDER — MEPERIDINE HYDROCHLORIDE 25 MG/ML
12.5 INJECTION INTRAMUSCULAR; INTRAVENOUS; SUBCUTANEOUS EVERY 5 MIN PRN
Status: DISCONTINUED | OUTPATIENT
Start: 2023-06-12 | End: 2023-06-12 | Stop reason: HOSPADM

## 2023-06-12 RX ORDER — OXYCODONE HYDROCHLORIDE 5 MG/1
5 TABLET ORAL EVERY 6 HOURS PRN
Qty: 20 TABLET | Refills: 0 | Status: SHIPPED | OUTPATIENT
Start: 2023-06-12 | End: 2023-06-17

## 2023-06-12 RX ORDER — HYDROMORPHONE HYDROCHLORIDE 1 MG/ML
0.5 INJECTION, SOLUTION INTRAMUSCULAR; INTRAVENOUS; SUBCUTANEOUS EVERY 5 MIN PRN
Status: DISCONTINUED | OUTPATIENT
Start: 2023-06-12 | End: 2023-06-12 | Stop reason: HOSPADM

## 2023-06-12 RX ORDER — CEPHALEXIN 500 MG/1
500 CAPSULE ORAL 3 TIMES DAILY
Qty: 21 CAPSULE | Refills: 0 | Status: SHIPPED | OUTPATIENT
Start: 2023-06-12 | End: 2023-06-19

## 2023-06-12 RX ADMIN — SODIUM CHLORIDE: 9 INJECTION, SOLUTION INTRAVENOUS at 09:12

## 2023-06-12 ASSESSMENT — PAIN - FUNCTIONAL ASSESSMENT: PAIN_FUNCTIONAL_ASSESSMENT: 0-10

## 2023-06-12 NOTE — H&P
Daniel Latif was seen and re-examined preoperatively today, June 12, 2023. There was no substantial change in his physical and medical status. Patient is fit for the proposed surgical procedure. All questions were appropriately addressed and had no further questions regarding the risks, benefits, and alternatives of the procedure. Daniel Latif and family wished to proceed.     Ester Munroe DO  Resident Physician  [address] [place] [address]  Otolaryngology Residency  6/12/2023  8:27 AM

## 2023-06-12 NOTE — PROGRESS NOTES
Instructed patient and his sister on how to empty lucio drain. Verbalized understanding. Discharge instructions given

## 2023-06-12 NOTE — DISCHARGE INSTRUCTIONS
DISCHARGE INSTRUCTIONS    Call our office for any questions/concerns and for follow up appointment    Please follow the instructions checked below:    ACTIVITY INSTRUCTIONS:  [x]Increase activity as tolerated    [x]No heavy lifting or strenuous activity     [x]No driving while taking pain medication    WOUND/DRESSING INSTRUCTIONS:  [x]May shower in 48 hours     [x]No sitting in bath tub, hot tub or swimming. [x]Ice to areas of pain for first 24 hours. [x]Steri strips to be removed in office   [x]Drain to be removed in office       MEDICATION INSTRUCTIONS:  [x]Take medication as prescribed. [x]When taking pain medications, you may experience dizziness or drowsiness. Do not drink alcohol or drive when taking these medications. [x]You may take Ibuprofen (over the counter) as per directions for mild pain. []Do not take any other acetaminophen (Tylenol) products while taking your pain medication. [x]You may experience constipation while taking pain medication - You may take over the counter stool softeners: docuscate (Colace) or sennosides S (Senokot - S). WORK:  []You may return to work without restrictions   [x]You may not return to work until after follow up appointment with your physcian    Call physician or go to the ER for any of the following or for questions/concerns:   Fever over 101° F    Redness, swelling, hardness or warmth at the wound site (s)  Unrelieved nausea/vomiting    Foul smelling or cloudy drainage at the wound site (s)   Unrelieved pain or increase in pain     Increase in shortness of breath           DRAIN CARE  What is the drain for? Your drain is to stop fluid from building up under the skin. It also helps your incision to heal.  Your doctor will take the drain out when there is less and less fluid coming out. What supplies do I need? A cup for the fluid to be emptied into and to measure the fluid.   The chart to record the amount of fluid (if your surgeon tells you to do

## 2023-06-12 NOTE — PROGRESS NOTES
INTRAOPERATIVE CONSULTATION (with FROZEN SECTION)  A. Left jugular vein mass, biopsy:  Reactive lymph node, negative for malignancy.

## 2023-06-13 LAB — GRAM STAIN ORDERABLE: NORMAL

## 2023-06-14 LAB — BACTERIA SPEC ANAEROBE CULT: NORMAL

## 2023-06-15 LAB
BACTERIA SPEC AEROBE CULT: ABNORMAL
ORGANISM: ABNORMAL

## 2023-06-20 ENCOUNTER — OFFICE VISIT (OUTPATIENT)
Dept: ENT CLINIC | Age: 58
End: 2023-06-20
Payer: COMMERCIAL

## 2023-06-20 VITALS
HEIGHT: 71 IN | DIASTOLIC BLOOD PRESSURE: 76 MMHG | BODY MASS INDEX: 34.16 KG/M2 | HEART RATE: 76 BPM | SYSTOLIC BLOOD PRESSURE: 111 MMHG | WEIGHT: 244 LBS

## 2023-06-20 DIAGNOSIS — C76.0 HEAD AND NECK CANCER (HCC): Primary | ICD-10-CM

## 2023-06-20 DIAGNOSIS — C79.89 METASTATIC SQUAMOUS CELL CARCINOMA TO HEAD AND NECK (HCC): ICD-10-CM

## 2023-06-20 PROCEDURE — 99215 OFFICE O/P EST HI 40 MIN: CPT | Performed by: OTOLARYNGOLOGY

## 2023-06-20 PROCEDURE — 3074F SYST BP LT 130 MM HG: CPT | Performed by: OTOLARYNGOLOGY

## 2023-06-20 PROCEDURE — 3078F DIAST BP <80 MM HG: CPT | Performed by: OTOLARYNGOLOGY

## 2023-06-23 ENCOUNTER — TELEPHONE (OUTPATIENT)
Dept: CASE MANAGEMENT | Age: 58
End: 2023-06-23

## 2023-06-23 NOTE — TELEPHONE ENCOUNTER
Updated by Natasha Sal MA that patient was referred to us from ENT but is a [address] [facility] patient with no authorization from the [facility]. Natasha Sal called and spoke with patient and he prefers to use his VA coverage for the consult. Patient provided our office with contact number of Rosa Maria Jackson at [facility]. Called and left a message with Neelam regarding referral and request for [facility] authorization for consult. Received a return message from [facility], RN that consult with Radiation Oncology has been approved by the [facility]. Called and left ALIVIA Richter a message requesting copy of authorization from [facility] for Radiation consult. Left our office fax number and my contact information for any questions. Once copy of [facility] auth received patient will be scheduled for consult accordingly.

## 2023-06-26 ENCOUNTER — TELEPHONE (OUTPATIENT)
Dept: ONCOLOGY | Age: 58
End: 2023-06-26

## 2023-06-27 ENCOUNTER — TELEPHONE (OUTPATIENT)
Dept: CASE MANAGEMENT | Age: 58
End: 2023-06-27

## 2023-07-11 ENCOUNTER — TELEPHONE (OUTPATIENT)
Dept: ENT CLINIC | Age: 58
End: 2023-07-11

## 2023-07-11 NOTE — TELEPHONE ENCOUNTER
Patient returned my call.   He had a PET scan done at [address] [facility] and had PET scan pushed into PACS

## 2023-07-11 NOTE — TELEPHONE ENCOUNTER
Dana Abdulkadir is being seen at [facility] for medical oncology services due to the [place]. Called patient LM on  to return my call to see if they have done a PET Scan?

## 2023-07-12 ENCOUNTER — TELEPHONE (OUTPATIENT)
Dept: ENT CLINIC | Age: 58
End: 2023-07-12

## 2023-07-12 ENCOUNTER — PREP FOR PROCEDURE (OUTPATIENT)
Dept: ENT CLINIC | Age: 58
End: 2023-07-12

## 2023-07-12 PROBLEM — C10.9 OROPHARYNGEAL CANCER (HCC): Status: ACTIVE | Noted: 2023-07-12

## 2023-07-12 NOTE — TELEPHONE ENCOUNTER
Prior Authorization Form:      DEMOGRAPHICS:                     Patient Name:  Peyton Keys  Patient :  1965            Insurance:  Payor: [employer] / Plan: [facility] [address] / Product Type: *No Product type* /   Insurance ID Number:    Payer/Plan Subscr  Sex Relation Sub. Ins. ID Effective Group Num   1. ERA Esquiveliff 1965 Male Self [id number]                                   [address] [address]   2.  Dave Esquiveliff 1965 Male Self [id number] 10/31/2000                                    [address]         DIAGNOSIS & PROCEDURE:                       Procedure/Operation: RADICAL TONSILLECTOMY,  PARTIAL GLOSSECTOMY           CPT Code: 44066, 15183, 20006    Diagnosis:  OROPHARYNGEAL CANCER    ICD10 Code: C10.9    Location:  [facility]    Surgeon:  DR Fani Santoyo INFORMATION:                          Date: 23    Time: N/A              Anesthesia:  General                                                       Status:  Outpatient        Special Comments:  ROBOT, PATHOLOGY       Electronically signed by Corky Richard MA on 2023 at 11:46 AM

## 2023-07-20 PROBLEM — C76.0 HEAD AND NECK CANCER (HCC): Status: ACTIVE | Noted: 2023-07-20

## 2023-07-20 PROBLEM — C79.89 METASTATIC SQUAMOUS CELL CARCINOMA TO HEAD AND NECK (HCC): Status: ACTIVE | Noted: 2023-07-20

## 2023-07-21 ENCOUNTER — ANESTHESIA EVENT (OUTPATIENT)
Dept: OPERATING ROOM | Age: 58
End: 2023-07-21
Payer: OTHER GOVERNMENT

## 2023-07-23 NOTE — H&P
Chief complaint-   Neck mass         History of Present Illness- Presents for evaluation of left neck mass. Patient reports onset of 5/2022 of left sided neck mass that progressively enlarged over time to significant size. Not associated with pain. First waited about 2 months as it continued to grow, was seen at [place], CT scan ordered and started on cleocin without improvement. Was then seen by Dr Leelee Menjivar, ENT, [place], who did CT neck 7/15/22 that showed multiloculated cystic mass left level II, FNA and aspirated 15cc of fluid that was negative for organism, no tissue diagnosis. Then given Augmentin in which the mass came to a head with white purulence and then auto expressed and had yellow tinged drainage. Since it self drained the mass is significantly decreased in size and has not re accumulated. Last took antibiotics 2 weeks ago. He now has residual seeping from the previous drainage site but no further swelling like first onset. Denies weight loss, fever, chills ,night sweats, change in voice. Nonsmoker, No etoh use. Denies recent dental work but has many dental caries that need addressed. 12/15/22: Pt here for 6 wk f/u neck mass after antibiotics. Notes less swelling since starting antibiotics. No pain. No difficulty swallowing. No change in voice. Drinks 4-5 bottles of water, 1-2 cups of coffee, and no alcohol. Weight is stable. 1/3/23: Pt here for FNA results. No pain. No difficulty swallowing. No change in voice. Drinks 4-5 bottles of water, 1-2 cups of coffee, and no alcohol. Weight is stable. 2/21/23: Pt here to discuss surgery to remove benign neck mass. No pain, some swelling and tugging. No difficulty swallowing. No change in voice. Drinks 4-5 bottles of water, 1-2 cups of coffee, and no alcohol. Weight is stable. Still there and painful at times. No draining for awhile. No other new lumps or bumps but worried about mass till being in his neck.        4/20/23: Pt here to discuss surgery to consented, was sprayed nasally with 1:1 neosynephrine and xylocaine. Scope was passed and found nasal cavity with no lesion. np clear, tonsil wnl, tongue mobile and no masses but fullness on left side of bot at lingual tonsil area- soft to palpation. , vocal cords mobile bilaterally with full ab and ad duction. Hypopharynx clear, open and no masses. Medical Decision Making and Treatment Plan. Hx of neck abscess that autodrained with improvement- differential includes abscess from dental origin, infected branchial cleft cyst, less likely malignancy   Discussed he needs to be evaluated by dentist for evaluation of dental extractions, referral placed  Asked pt to get the CD of his previous CT scans and bring to office for review   If persists after dental evaluation, will consider repeat CT scan at next visit   Will start on PCN V for possible actinomyces/dental infection- done, no more needed  Follow up in 4-6 weeks for re-evaluation, discussed signs and symptoms on which to return sooner- still with mass present. Not draining anymore  D/w pt and family regarding txment of neck mass and concern if just slowly resolving abscess vs an occult malignancy. Surgical excision could be an option to just remove it to eval for occult malignancy vs removal of abscess walled off. Pt wishes to think about his options and will further discuss at next visit. Pt wishes for it to be removed as it is painful and worried about recurrance or mallignancy. may scope at next visit - done- fullness of left bot  Ct neck with contrast ordered to better eval mass of tongue and any changes in neck to plan for surgery- extent of surgery and whether panendo with tongue biopsy would be warranted. Ct with changes in left neck but no overt uadt mass or changes. Recommend excision of mass to include neck contents and possibly parotid and tonsil as it may be a branchial mass.  He understnads will also do a panendo to

## 2023-07-24 ENCOUNTER — ANESTHESIA (OUTPATIENT)
Dept: OPERATING ROOM | Age: 58
End: 2023-07-24
Payer: OTHER GOVERNMENT

## 2023-07-24 ENCOUNTER — HOSPITAL ENCOUNTER (OUTPATIENT)
Age: 58
Setting detail: OBSERVATION
Discharge: HOME | End: 2023-07-25
Attending: OTOLARYNGOLOGY | Admitting: OTOLARYNGOLOGY
Payer: OTHER GOVERNMENT

## 2023-07-24 DIAGNOSIS — C10.9 OROPHARYNGEAL CANCER (HCC): ICD-10-CM

## 2023-07-24 DIAGNOSIS — Z01.812 PRE-OPERATIVE LABORATORY EXAMINATION: Primary | ICD-10-CM

## 2023-07-24 DIAGNOSIS — C09.9 TONSIL CANCER (HCC): ICD-10-CM

## 2023-07-24 LAB
ERYTHROCYTE [DISTWIDTH] IN BLOOD BY AUTOMATED COUNT: 13.4 % (ref 11.5–15)
HCT VFR BLD AUTO: 41.9 % (ref 37–54)
HGB BLD-MCNC: 14.2 G/DL (ref 12.5–16.5)
MCH RBC QN AUTO: 31.2 PG (ref 26–35)
MCHC RBC AUTO-ENTMCNC: 33.9 G/DL (ref 32–34.5)
MCV RBC AUTO: 92.1 FL (ref 80–99.9)
PLATELET # BLD AUTO: 249 K/UL (ref 130–450)
PMV BLD AUTO: 10.9 FL (ref 7–12)
RBC # BLD AUTO: 4.55 M/UL (ref 3.8–5.8)
WBC OTHER # BLD: 6.8 K/UL (ref 4.5–11.5)

## 2023-07-24 PROCEDURE — 2580000003 HC RX 258

## 2023-07-24 PROCEDURE — 2580000003 HC RX 258: Performed by: OTOLARYNGOLOGY

## 2023-07-24 PROCEDURE — 6370000000 HC RX 637 (ALT 250 FOR IP): Performed by: OTOLARYNGOLOGY

## 2023-07-24 PROCEDURE — 88360 TUMOR IMMUNOHISTOCHEM/MANUAL: CPT

## 2023-07-24 PROCEDURE — 3600000017 HC SURGERY HYBRID ADDL 15MIN: Performed by: OTOLARYNGOLOGY

## 2023-07-24 PROCEDURE — 2720000010 HC SURG SUPPLY STERILE: Performed by: OTOLARYNGOLOGY

## 2023-07-24 PROCEDURE — 3700000001 HC ADD 15 MINUTES (ANESTHESIA): Performed by: OTOLARYNGOLOGY

## 2023-07-24 PROCEDURE — 88309 TISSUE EXAM BY PATHOLOGIST: CPT

## 2023-07-24 PROCEDURE — G0378 HOSPITAL OBSERVATION PER HR: HCPCS

## 2023-07-24 PROCEDURE — C9399 UNCLASSIFIED DRUGS OR BIOLOG: HCPCS

## 2023-07-24 PROCEDURE — 6360000002 HC RX W HCPCS

## 2023-07-24 PROCEDURE — 7100000001 HC PACU RECOVERY - ADDTL 15 MIN: Performed by: OTOLARYNGOLOGY

## 2023-07-24 PROCEDURE — 3600000007 HC SURGERY HYBRID BASE: Performed by: OTOLARYNGOLOGY

## 2023-07-24 PROCEDURE — 6360000002 HC RX W HCPCS: Performed by: OTOLARYNGOLOGY

## 2023-07-24 PROCEDURE — 88342 IMHCHEM/IMCYTCHM 1ST ANTB: CPT

## 2023-07-24 PROCEDURE — 85027 COMPLETE CBC AUTOMATED: CPT

## 2023-07-24 PROCEDURE — 2500000003 HC RX 250 WO HCPCS

## 2023-07-24 PROCEDURE — 7100000000 HC PACU RECOVERY - FIRST 15 MIN: Performed by: OTOLARYNGOLOGY

## 2023-07-24 PROCEDURE — 6370000000 HC RX 637 (ALT 250 FOR IP)

## 2023-07-24 PROCEDURE — 3700000000 HC ANESTHESIA ATTENDED CARE: Performed by: OTOLARYNGOLOGY

## 2023-07-24 PROCEDURE — 2709999900 HC NON-CHARGEABLE SUPPLY: Performed by: OTOLARYNGOLOGY

## 2023-07-24 RX ORDER — SODIUM CHLORIDE 0.9 % (FLUSH) 0.9 %
5-40 SYRINGE (ML) INJECTION EVERY 12 HOURS SCHEDULED
Status: DISCONTINUED | OUTPATIENT
Start: 2023-07-24 | End: 2023-07-25 | Stop reason: HOSPADM

## 2023-07-24 RX ORDER — SODIUM CHLORIDE, SODIUM LACTATE, POTASSIUM CHLORIDE, CALCIUM CHLORIDE 600; 310; 30; 20 MG/100ML; MG/100ML; MG/100ML; MG/100ML
INJECTION, SOLUTION INTRAVENOUS CONTINUOUS
Status: DISCONTINUED | OUTPATIENT
Start: 2023-07-24 | End: 2023-07-25 | Stop reason: HOSPADM

## 2023-07-24 RX ORDER — SODIUM CHLORIDE 9 MG/ML
INJECTION, SOLUTION INTRAVENOUS CONTINUOUS PRN
Status: DISCONTINUED | OUTPATIENT
Start: 2023-07-24 | End: 2023-07-24 | Stop reason: SDUPTHER

## 2023-07-24 RX ORDER — SODIUM CHLORIDE 9 MG/ML
INJECTION, SOLUTION INTRAVENOUS PRN
Status: DISCONTINUED | OUTPATIENT
Start: 2023-07-24 | End: 2023-07-24 | Stop reason: HOSPADM

## 2023-07-24 RX ORDER — HYDROMORPHONE HYDROCHLORIDE 1 MG/ML
0.25 INJECTION, SOLUTION INTRAMUSCULAR; INTRAVENOUS; SUBCUTANEOUS EVERY 5 MIN PRN
Status: DISCONTINUED | OUTPATIENT
Start: 2023-07-24 | End: 2023-07-24 | Stop reason: HOSPADM

## 2023-07-24 RX ORDER — CARVEDILOL 3.12 MG/1
3.12 TABLET ORAL 2 TIMES DAILY WITH MEALS
Status: DISCONTINUED | OUTPATIENT
Start: 2023-07-24 | End: 2023-07-25 | Stop reason: HOSPADM

## 2023-07-24 RX ORDER — PROPOFOL 10 MG/ML
INJECTION, EMULSION INTRAVENOUS PRN
Status: DISCONTINUED | OUTPATIENT
Start: 2023-07-24 | End: 2023-07-24 | Stop reason: SDUPTHER

## 2023-07-24 RX ORDER — OXYCODONE HYDROCHLORIDE 5 MG/1
5 TABLET ORAL EVERY 6 HOURS PRN
Qty: 20 TABLET | Refills: 0 | Status: SHIPPED | OUTPATIENT
Start: 2023-07-24 | End: 2023-07-29

## 2023-07-24 RX ORDER — DIPHENHYDRAMINE HYDROCHLORIDE 50 MG/ML
12.5 INJECTION INTRAMUSCULAR; INTRAVENOUS
Status: DISCONTINUED | OUTPATIENT
Start: 2023-07-24 | End: 2023-07-24 | Stop reason: HOSPADM

## 2023-07-24 RX ORDER — HYDROMORPHONE HYDROCHLORIDE 1 MG/ML
0.5 INJECTION, SOLUTION INTRAMUSCULAR; INTRAVENOUS; SUBCUTANEOUS EVERY 5 MIN PRN
Status: DISCONTINUED | OUTPATIENT
Start: 2023-07-24 | End: 2023-07-24 | Stop reason: HOSPADM

## 2023-07-24 RX ORDER — ONDANSETRON 4 MG/1
4 TABLET, ORALLY DISINTEGRATING ORAL EVERY 8 HOURS PRN
Status: DISCONTINUED | OUTPATIENT
Start: 2023-07-24 | End: 2023-07-25 | Stop reason: HOSPADM

## 2023-07-24 RX ORDER — EPINEPHRINE NASAL SOLUTION 1 MG/ML
SOLUTION NASAL PRN
Status: DISCONTINUED | OUTPATIENT
Start: 2023-07-24 | End: 2023-07-24 | Stop reason: ALTCHOICE

## 2023-07-24 RX ORDER — ONDANSETRON 2 MG/ML
4 INJECTION INTRAMUSCULAR; INTRAVENOUS
Status: DISCONTINUED | OUTPATIENT
Start: 2023-07-24 | End: 2023-07-24 | Stop reason: HOSPADM

## 2023-07-24 RX ORDER — SODIUM CHLORIDE 0.9 % (FLUSH) 0.9 %
5-40 SYRINGE (ML) INJECTION PRN
Status: DISCONTINUED | OUTPATIENT
Start: 2023-07-24 | End: 2023-07-24 | Stop reason: HOSPADM

## 2023-07-24 RX ORDER — HYDRALAZINE HYDROCHLORIDE 20 MG/ML
10 INJECTION INTRAMUSCULAR; INTRAVENOUS
Status: DISCONTINUED | OUTPATIENT
Start: 2023-07-24 | End: 2023-07-24 | Stop reason: HOSPADM

## 2023-07-24 RX ORDER — SODIUM CHLORIDE 0.9 % (FLUSH) 0.9 %
5-40 SYRINGE (ML) INJECTION PRN
Status: DISCONTINUED | OUTPATIENT
Start: 2023-07-24 | End: 2023-07-25 | Stop reason: HOSPADM

## 2023-07-24 RX ORDER — DROPERIDOL 2.5 MG/ML
0.62 INJECTION, SOLUTION INTRAMUSCULAR; INTRAVENOUS
Status: DISCONTINUED | OUTPATIENT
Start: 2023-07-24 | End: 2023-07-24 | Stop reason: HOSPADM

## 2023-07-24 RX ORDER — SODIUM CHLORIDE 0.9 % (FLUSH) 0.9 %
5-40 SYRINGE (ML) INJECTION EVERY 12 HOURS SCHEDULED
Status: DISCONTINUED | OUTPATIENT
Start: 2023-07-24 | End: 2023-07-24 | Stop reason: HOSPADM

## 2023-07-24 RX ORDER — ONDANSETRON 4 MG/1
4 TABLET, ORALLY DISINTEGRATING ORAL 3 TIMES DAILY PRN
Qty: 21 TABLET | Refills: 0 | Status: SHIPPED | OUTPATIENT
Start: 2023-07-24

## 2023-07-24 RX ORDER — FENTANYL CITRATE 50 UG/ML
INJECTION, SOLUTION INTRAMUSCULAR; INTRAVENOUS PRN
Status: DISCONTINUED | OUTPATIENT
Start: 2023-07-24 | End: 2023-07-24 | Stop reason: SDUPTHER

## 2023-07-24 RX ORDER — MORPHINE SULFATE 2 MG/ML
2 INJECTION, SOLUTION INTRAMUSCULAR; INTRAVENOUS
Status: DISCONTINUED | OUTPATIENT
Start: 2023-07-24 | End: 2023-07-25 | Stop reason: HOSPADM

## 2023-07-24 RX ORDER — SENNA AND DOCUSATE SODIUM 50; 8.6 MG/1; MG/1
1 TABLET, FILM COATED ORAL 2 TIMES DAILY
Status: DISCONTINUED | OUTPATIENT
Start: 2023-07-24 | End: 2023-07-25 | Stop reason: HOSPADM

## 2023-07-24 RX ORDER — OXYCODONE HYDROCHLORIDE 5 MG/1
5 TABLET ORAL EVERY 4 HOURS PRN
Status: DISCONTINUED | OUTPATIENT
Start: 2023-07-24 | End: 2023-07-25 | Stop reason: HOSPADM

## 2023-07-24 RX ORDER — PROPOFOL 10 MG/ML
INJECTION, EMULSION INTRAVENOUS CONTINUOUS PRN
Status: DISCONTINUED | OUTPATIENT
Start: 2023-07-24 | End: 2023-07-24 | Stop reason: SDUPTHER

## 2023-07-24 RX ORDER — ACETAMINOPHEN 325 MG/1
650 TABLET ORAL
Status: DISCONTINUED | OUTPATIENT
Start: 2023-07-24 | End: 2023-07-24 | Stop reason: HOSPADM

## 2023-07-24 RX ORDER — LIDOCAINE HYDROCHLORIDE 20 MG/ML
INJECTION, SOLUTION INTRAVENOUS PRN
Status: DISCONTINUED | OUTPATIENT
Start: 2023-07-24 | End: 2023-07-24 | Stop reason: SDUPTHER

## 2023-07-24 RX ORDER — OXYMETAZOLINE HYDROCHLORIDE 0.05 G/100ML
SPRAY NASAL PRN
Status: DISCONTINUED | OUTPATIENT
Start: 2023-07-24 | End: 2023-07-24 | Stop reason: SDUPTHER

## 2023-07-24 RX ORDER — SODIUM CHLORIDE 9 MG/ML
INJECTION, SOLUTION INTRAVENOUS PRN
Status: DISCONTINUED | OUTPATIENT
Start: 2023-07-24 | End: 2023-07-25 | Stop reason: HOSPADM

## 2023-07-24 RX ORDER — MAGNESIUM SULFATE HEPTAHYDRATE 500 MG/ML
INJECTION, SOLUTION INTRAMUSCULAR; INTRAVENOUS PRN
Status: DISCONTINUED | OUTPATIENT
Start: 2023-07-24 | End: 2023-07-24 | Stop reason: SDUPTHER

## 2023-07-24 RX ORDER — CHLORHEXIDINE GLUCONATE 0.12 MG/ML
15 RINSE ORAL 2 TIMES DAILY
Qty: 420 ML | Refills: 0 | Status: SHIPPED | OUTPATIENT
Start: 2023-07-24 | End: 2023-08-07

## 2023-07-24 RX ORDER — OXYCODONE HYDROCHLORIDE 10 MG/1
10 TABLET ORAL EVERY 4 HOURS PRN
Status: DISCONTINUED | OUTPATIENT
Start: 2023-07-24 | End: 2023-07-25 | Stop reason: HOSPADM

## 2023-07-24 RX ORDER — ONDANSETRON 2 MG/ML
INJECTION INTRAMUSCULAR; INTRAVENOUS PRN
Status: DISCONTINUED | OUTPATIENT
Start: 2023-07-24 | End: 2023-07-24 | Stop reason: SDUPTHER

## 2023-07-24 RX ORDER — MORPHINE SULFATE 4 MG/ML
4 INJECTION, SOLUTION INTRAMUSCULAR; INTRAVENOUS
Status: DISCONTINUED | OUTPATIENT
Start: 2023-07-24 | End: 2023-07-25 | Stop reason: HOSPADM

## 2023-07-24 RX ORDER — ACETAMINOPHEN 325 MG/1
650 TABLET ORAL EVERY 6 HOURS
Status: DISCONTINUED | OUTPATIENT
Start: 2023-07-24 | End: 2023-07-25 | Stop reason: HOSPADM

## 2023-07-24 RX ORDER — OXYCODONE HYDROCHLORIDE 5 MG/1
5 TABLET ORAL
Status: DISCONTINUED | OUTPATIENT
Start: 2023-07-24 | End: 2023-07-24 | Stop reason: HOSPADM

## 2023-07-24 RX ORDER — SODIUM CHLORIDE, SODIUM LACTATE, POTASSIUM CHLORIDE, CALCIUM CHLORIDE 600; 310; 30; 20 MG/100ML; MG/100ML; MG/100ML; MG/100ML
INJECTION, SOLUTION INTRAVENOUS CONTINUOUS PRN
Status: DISCONTINUED | OUTPATIENT
Start: 2023-07-24 | End: 2023-07-24 | Stop reason: SDUPTHER

## 2023-07-24 RX ORDER — ROCURONIUM BROMIDE 10 MG/ML
INJECTION, SOLUTION INTRAVENOUS PRN
Status: DISCONTINUED | OUTPATIENT
Start: 2023-07-24 | End: 2023-07-24 | Stop reason: SDUPTHER

## 2023-07-24 RX ORDER — ATORVASTATIN CALCIUM 80 MG/1
80 TABLET, FILM COATED ORAL DAILY
Status: DISCONTINUED | OUTPATIENT
Start: 2023-07-24 | End: 2023-07-25 | Stop reason: HOSPADM

## 2023-07-24 RX ORDER — SODIUM CHLORIDE 9 MG/ML
INJECTION, SOLUTION INTRAVENOUS CONTINUOUS
Status: DISCONTINUED | OUTPATIENT
Start: 2023-07-24 | End: 2023-07-25 | Stop reason: HOSPADM

## 2023-07-24 RX ORDER — LIDOCAINE HYDROCHLORIDE ANHYDROUS AND DEXTROSE MONOHYDRATE 5; 400 G/100ML; MG/100ML
INJECTION, SOLUTION INTRAVENOUS CONTINUOUS PRN
Status: DISCONTINUED | OUTPATIENT
Start: 2023-07-24 | End: 2023-07-24 | Stop reason: SDUPTHER

## 2023-07-24 RX ORDER — LISINOPRIL 5 MG/1
5 TABLET ORAL DAILY
Status: DISCONTINUED | OUTPATIENT
Start: 2023-07-24 | End: 2023-07-25 | Stop reason: HOSPADM

## 2023-07-24 RX ORDER — DEXAMETHASONE SODIUM PHOSPHATE 10 MG/ML
INJECTION INTRAMUSCULAR; INTRAVENOUS PRN
Status: DISCONTINUED | OUTPATIENT
Start: 2023-07-24 | End: 2023-07-24 | Stop reason: SDUPTHER

## 2023-07-24 RX ORDER — DEXTROSE MONOHYDRATE 100 MG/ML
INJECTION, SOLUTION INTRAVENOUS CONTINUOUS PRN
Status: DISCONTINUED | OUTPATIENT
Start: 2023-07-24 | End: 2023-07-24 | Stop reason: HOSPADM

## 2023-07-24 RX ORDER — ONDANSETRON 2 MG/ML
4 INJECTION INTRAMUSCULAR; INTRAVENOUS EVERY 6 HOURS PRN
Status: DISCONTINUED | OUTPATIENT
Start: 2023-07-24 | End: 2023-07-25 | Stop reason: HOSPADM

## 2023-07-24 RX ORDER — LABETALOL HYDROCHLORIDE 5 MG/ML
10 INJECTION, SOLUTION INTRAVENOUS
Status: DISCONTINUED | OUTPATIENT
Start: 2023-07-24 | End: 2023-07-24 | Stop reason: HOSPADM

## 2023-07-24 RX ORDER — MIDAZOLAM HYDROCHLORIDE 1 MG/ML
INJECTION INTRAMUSCULAR; INTRAVENOUS PRN
Status: DISCONTINUED | OUTPATIENT
Start: 2023-07-24 | End: 2023-07-24 | Stop reason: SDUPTHER

## 2023-07-24 RX ADMIN — MIDAZOLAM 2 MG: 1 INJECTION INTRAMUSCULAR; INTRAVENOUS at 14:26

## 2023-07-24 RX ADMIN — LISINOPRIL 5 MG: 5 TABLET ORAL at 23:24

## 2023-07-24 RX ADMIN — CARVEDILOL 3.12 MG: 3.12 TABLET, FILM COATED ORAL at 23:24

## 2023-07-24 RX ADMIN — CEFAZOLIN 2000 MG: 2 INJECTION, POWDER, FOR SOLUTION INTRAMUSCULAR; INTRAVENOUS at 14:53

## 2023-07-24 RX ADMIN — FENTANYL CITRATE 50 MCG: 0.05 INJECTION, SOLUTION INTRAMUSCULAR; INTRAVENOUS at 15:10

## 2023-07-24 RX ADMIN — DEXAMETHASONE SODIUM PHOSPHATE 10 MG: 10 INJECTION INTRAMUSCULAR; INTRAVENOUS at 15:04

## 2023-07-24 RX ADMIN — Medication 2 SPRAY: at 14:26

## 2023-07-24 RX ADMIN — FENTANYL CITRATE 50 MCG: 0.05 INJECTION, SOLUTION INTRAMUSCULAR; INTRAVENOUS at 17:00

## 2023-07-24 RX ADMIN — ONDANSETRON 4 MG: 2 INJECTION INTRAMUSCULAR; INTRAVENOUS at 16:29

## 2023-07-24 RX ADMIN — ATORVASTATIN CALCIUM 80 MG: 80 TABLET, FILM COATED ORAL at 23:24

## 2023-07-24 RX ADMIN — SODIUM CHLORIDE: 9 INJECTION, SOLUTION INTRAVENOUS at 23:54

## 2023-07-24 RX ADMIN — SODIUM CHLORIDE: 9 INJECTION, SOLUTION INTRAVENOUS at 12:02

## 2023-07-24 RX ADMIN — SODIUM CHLORIDE, POTASSIUM CHLORIDE, SODIUM LACTATE AND CALCIUM CHLORIDE: 600; 310; 30; 20 INJECTION, SOLUTION INTRAVENOUS at 14:51

## 2023-07-24 RX ADMIN — FENTANYL CITRATE 100 MCG: 0.05 INJECTION, SOLUTION INTRAMUSCULAR; INTRAVENOUS at 14:40

## 2023-07-24 RX ADMIN — PHENYLEPHRINE HYDROCHLORIDE 100 MCG: 10 INJECTION INTRAVENOUS at 15:18

## 2023-07-24 RX ADMIN — ROCURONIUM BROMIDE 50 MG: 10 INJECTION, SOLUTION INTRAVENOUS at 14:41

## 2023-07-24 RX ADMIN — SODIUM CHLORIDE: 9 INJECTION, SOLUTION INTRAVENOUS at 14:26

## 2023-07-24 RX ADMIN — FENTANYL CITRATE 50 MCG: 0.05 INJECTION, SOLUTION INTRAMUSCULAR; INTRAVENOUS at 16:09

## 2023-07-24 RX ADMIN — SODIUM CHLORIDE, PRESERVATIVE FREE 5 ML: 5 INJECTION INTRAVENOUS at 23:25

## 2023-07-24 RX ADMIN — PROPOFOL 200 MCG/KG/MIN: 10 INJECTION, EMULSION INTRAVENOUS at 14:53

## 2023-07-24 RX ADMIN — MAGNESIUM SULFATE HEPTAHYDRATE 2 G: 500 INJECTION, SOLUTION INTRAMUSCULAR; INTRAVENOUS at 15:03

## 2023-07-24 RX ADMIN — LIDOCAINE HYDROCHLORIDE ANHYDROUS AND DEXTROSE MONOHYDRATE 2 MG/KG/HR: .4; 5 INJECTION, SOLUTION INTRAVENOUS at 14:53

## 2023-07-24 RX ADMIN — PROPOFOL 200 MG: 10 INJECTION, EMULSION INTRAVENOUS at 14:40

## 2023-07-24 RX ADMIN — SUGAMMADEX 200 MG: 100 INJECTION, SOLUTION INTRAVENOUS at 16:39

## 2023-07-24 RX ADMIN — ROCURONIUM BROMIDE 20 MG: 10 INJECTION, SOLUTION INTRAVENOUS at 16:09

## 2023-07-24 RX ADMIN — ACETAMINOPHEN 650 MG: 325 TABLET ORAL at 23:23

## 2023-07-24 RX ADMIN — SENNOSIDES AND DOCUSATE SODIUM 1 TABLET: 50; 8.6 TABLET ORAL at 23:24

## 2023-07-24 RX ADMIN — LIDOCAINE HYDROCHLORIDE 100 MG: 20 INJECTION, SOLUTION INTRAVENOUS at 14:40

## 2023-07-24 RX ADMIN — SODIUM CHLORIDE: 9 INJECTION, SOLUTION INTRAVENOUS at 16:22

## 2023-07-24 RX ADMIN — ROCURONIUM BROMIDE 20 MG: 10 INJECTION, SOLUTION INTRAVENOUS at 15:15

## 2023-07-24 ASSESSMENT — PAIN DESCRIPTION - LOCATION: LOCATION: HEAD

## 2023-07-24 ASSESSMENT — LIFESTYLE VARIABLES: SMOKING_STATUS: 0

## 2023-07-24 ASSESSMENT — ENCOUNTER SYMPTOMS
DYSPNEA ACTIVITY LEVEL: AFTER AMBULATING 1 FLIGHT OF STAIRS
SHORTNESS OF BREATH: 1

## 2023-07-24 ASSESSMENT — PAIN SCALES - GENERAL
PAINLEVEL_OUTOF10: 2
PAINLEVEL_OUTOF10: 5

## 2023-07-24 ASSESSMENT — PAIN DESCRIPTION - PAIN TYPE: TYPE: SURGICAL PAIN

## 2023-07-24 ASSESSMENT — PAIN - FUNCTIONAL ASSESSMENT: PAIN_FUNCTIONAL_ASSESSMENT: NONE - DENIES PAIN

## 2023-07-24 ASSESSMENT — PAIN DESCRIPTION - DESCRIPTORS: DESCRIPTORS: ACHING;DISCOMFORT

## 2023-07-24 NOTE — DISCHARGE INSTRUCTIONS
1. Follow up with Dr Corky Gooden as scheduled     2. A prescription for pain medication has been provided. Alternate between tylenol and ibuprofen every 4 hours. Take narcotic pain medicine every 6 hours as needed for break through pain. 3. DIET: Soft diet only for 2 weeks. Avoid potato chips, peanuts, popcorn, and citrus juice. DAY OF OPERATION: Small quantities of water frequently repeated. Also sherbet in small quantity frequently repeated, cracked ice and popsicles. SECOND DAY: Milk, strained cereal, jello, pudding, beef or chicken broth, horacio, pop, and popsicles. THIRD & FOURTH DAYS: Soft foods may be added gradually-mashed potatoes, soft cereals, soft boiled eggs, milk toast, etc.    4. Gargles should not be attempted unless recommended. Objectionable odors from the mouth are to be expected for several days. Coughing and hawking or clearing the throat are to be avoided as much as possible since bleeding may be started. Pain in the ears is common and usually comes from the throat. It is worse at night and before meals and in the morning. Frequent chewing of bubble gum or regular gum will help ease the pain. 5. Call the doctor if bleeding from the throat or nose occurs. If bleeding persists present to [facility] on [address].     6. A rise of 1/2 to 1 degree in the patients temperature post-operatively is usually expected in those who do not take adequate amount of liquids, and is caused by mild dehydration rather than infection. 7. NO STRENUOUS ACTIVITY FOR 2 WEEKS AFTER SURGERY. 8.No travel from the area for 2 weeks after surgery.

## 2023-07-24 NOTE — H&P
H&P reviewed, no changes. No issues. Questions and concerns were answered to the patient's satisfaction.  Will proceed with procedure    Will discuss with attending     Electronically signed by Jorge Grijalva DO on 7/24/23 at 1:32 PM EDT

## 2023-07-24 NOTE — ANESTHESIA POSTPROCEDURE EVALUATION
Department of Anesthesiology  Postprocedure Note    Patient: Gale Bansal  MRN: [id number]  YOB: 1965  Date of evaluation: 7/24/2023      Procedure Summary     Date: 07/24/23 Room / Location: [facility] OR 05 / [facility]    Anesthesia Start: 8706 Anesthesia Stop: 0328    Procedures:       TRANSORAL SURGERY ROBOTIC, RADICAL TONSILLECTOMY,  PARTIAL GLOSSECTOMY, PARTIAL PHARYNGECTOMY (Mouth)      TONSILLECTOMY (Left: Throat)      MOUTH LESION BIOPSY EXCISION (Mouth)      LARYNGOSCOPY (Throat) Diagnosis:       Oropharyngeal cancer ([address])      (Oropharyngeal cancer ([address]) [C10.9])    Surgeons:  Mandeep Rowland MD Responsible Provider: Joan Sanchez MD    Anesthesia Type: General ASA Status: 3          Anesthesia Type: General    Staci Phase I: Staci Score: 9    Staci Phase II:        Anesthesia Post Evaluation    Patient location during evaluation: PACU  Patient participation: complete - patient participated  Level of consciousness: awake and alert  Airway patency: patent  Nausea & Vomiting: no nausea and no vomiting  Complications: no  Cardiovascular status: blood pressure returned to baseline  Respiratory status: acceptable  Hydration status: euvolemic

## 2023-07-25 VITALS
OXYGEN SATURATION: 94 % | SYSTOLIC BLOOD PRESSURE: 124 MMHG | WEIGHT: 241 LBS | RESPIRATION RATE: 18 BRPM | DIASTOLIC BLOOD PRESSURE: 84 MMHG | HEART RATE: 98 BPM | TEMPERATURE: 98 F | HEIGHT: 70 IN | BODY MASS INDEX: 34.5 KG/M2

## 2023-07-25 PROCEDURE — G0378 HOSPITAL OBSERVATION PER HR: HCPCS

## 2023-07-25 PROCEDURE — 2580000003 HC RX 258: Performed by: OTOLARYNGOLOGY

## 2023-07-25 PROCEDURE — 94664 DEMO&/EVAL PT USE INHALER: CPT

## 2023-07-25 PROCEDURE — 96361 HYDRATE IV INFUSION ADD-ON: CPT

## 2023-07-25 PROCEDURE — 96360 HYDRATION IV INFUSION INIT: CPT

## 2023-07-25 PROCEDURE — 94640 AIRWAY INHALATION TREATMENT: CPT

## 2023-07-25 PROCEDURE — 6370000000 HC RX 637 (ALT 250 FOR IP)

## 2023-07-25 PROCEDURE — 6370000000 HC RX 637 (ALT 250 FOR IP): Performed by: OTOLARYNGOLOGY

## 2023-07-25 RX ORDER — IPRATROPIUM BROMIDE AND ALBUTEROL SULFATE 2.5; .5 MG/3ML; MG/3ML
1 SOLUTION RESPIRATORY (INHALATION) ONCE
Status: COMPLETED | OUTPATIENT
Start: 2023-07-25 | End: 2023-07-25

## 2023-07-25 RX ADMIN — ATORVASTATIN CALCIUM 80 MG: 80 TABLET, FILM COATED ORAL at 08:46

## 2023-07-25 RX ADMIN — ACETAMINOPHEN 650 MG: 325 TABLET ORAL at 06:45

## 2023-07-25 RX ADMIN — SODIUM CHLORIDE, PRESERVATIVE FREE 10 ML: 5 INJECTION INTRAVENOUS at 08:46

## 2023-07-25 RX ADMIN — IPRATROPIUM BROMIDE AND ALBUTEROL SULFATE 1 DOSE: 2.5; .5 SOLUTION RESPIRATORY (INHALATION) at 04:21

## 2023-07-25 RX ADMIN — ACETAMINOPHEN 650 MG: 325 TABLET ORAL at 11:46

## 2023-07-25 RX ADMIN — SENNOSIDES AND DOCUSATE SODIUM 1 TABLET: 50; 8.6 TABLET ORAL at 08:46

## 2023-07-25 RX ADMIN — CARVEDILOL 3.12 MG: 3.12 TABLET, FILM COATED ORAL at 08:46

## 2023-07-25 RX ADMIN — LISINOPRIL 5 MG: 5 TABLET ORAL at 08:49

## 2023-07-25 ASSESSMENT — PAIN SCALES - GENERAL
PAINLEVEL_OUTOF10: 0
PAINLEVEL_OUTOF10: 5

## 2023-07-25 ASSESSMENT — PAIN DESCRIPTION - DESCRIPTORS: DESCRIPTORS: ACHING;DISCOMFORT

## 2023-07-25 ASSESSMENT — PAIN DESCRIPTION - LOCATION: LOCATION: HEAD

## 2023-07-25 NOTE — CARE COORDINATION
7/25/2023 social work transition of care planning  Pt is from home with Gf and had been independent. Pt sees a pcp at the [facility] in [facility] and uses [facility] in [place]. Explained sw role in transition of care planning. Pt plan is home, pt will call for a ride at discharge.   Electronically signed by ELHAM Mitchell on 7/25/2023 at 8:21 AM

## 2023-07-25 NOTE — DISCHARGE SUMMARY
Physician Discharge Summary     Kaylah Hemphill  [id number]    Admit date: 7/24/2023    Discharge date and time: 7/25/23    Admitting Physician:  Lunette Galeazzi, MD     Admission Diagnoses:   Patient Active Problem List   Diagnosis    Acute coronary syndrome [facility])    Old myocardial infarction    Diabetes mellitus type 2, uncontrolled    Cardiac arrest ([address])    Presence of drug coated stent in anterior descending branch of left coronary artery    Anxiety state    Atherosclerotic heart disease of native coronary artery without angina pectoris    Benign essential hypertension    Combined forms of age-related cataract, bilateral    Dizziness and giddiness    Gastroesophageal reflux disease    Hyperlipidemia    Localized enlarged lymph nodes    Localized swelling, mass and lump, neck    Occlusion and stenosis of bilateral carotid arteries    Occult blood in stools    Other fatigue    Pure hypercholesterolemia    Subcutaneous mass of neck    Fistula between ear and skin of neck    Post-op pain    Oropharyngeal cancer (HCC)    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)    Head and neck cancer ([address])    Pre-operative laboratory examination    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[facility])       Discharge Diagnoses:   Patient Active Problem List   Diagnosis    Acute coronary syndrome [facility])    Old myocardial infarction    Diabetes mellitus type 2, uncontrolled    Cardiac arrest ([address])    Presence of drug coated stent in anterior descending branch of left coronary artery    Anxiety state    Atherosclerotic heart disease of native coronary artery without angina pectoris    Benign essential hypertension    Combined forms of age-related cataract, bilateral    Dizziness and giddiness    Gastroesophageal reflux disease    Hyperlipidemia    Localized enlarged lymph nodes    Localized swelling, mass and lump, neck    Occlusion and stenosis of bilateral carotid arteries    Occult blood in stools    Other fatigue    Pure hypercholesterolemia

## 2023-07-25 NOTE — PLAN OF CARE
Problem: Discharge Planning  Goal: Discharge to home or other facility with appropriate resources  Outcome: Completed  Flowsheets  Taken 7/25/2023 0825 by Giovani Fuentes RN  Discharge to home or other facility with appropriate resources: Identify barriers to discharge with patient and caregiver  Taken 8/61/2177 [id number] by Manjinder Pak RN  Discharge to home or other facility with appropriate resources:   Identify barriers to discharge with patient and caregiver   Arrange for needed discharge resources and transportation as appropriate   Identify discharge learning needs (meds, wound care, etc)   Arrange for interpreters to assist at discharge as needed   Refer to discharge planning if patient needs post-hospital services based on physician order or complex needs related to functional status, cognitive ability or social support system     Problem: Chronic Conditions and Co-morbidities  Goal: Patient's chronic conditions and co-morbidity symptoms are monitored and maintained or improved  Outcome: Completed     Problem: Safety - Adult  Goal: Free from fall injury  Outcome: Completed     Problem: Pain  Goal: Verbalizes/displays adequate comfort level or baseline comfort level  Outcome: Completed

## 2023-07-31 ENCOUNTER — TELEPHONE (OUTPATIENT)
Dept: ENT CLINIC | Age: 58
End: 2023-07-31

## 2023-07-31 NOTE — TELEPHONE ENCOUNTER
Patient states he is still having severe pain after his tonsillectomy/partial glossectomy on 7/24/23. States he is barely able to drink and is not eating much, if at all. Has lost 17 lbs since surgery. Asking what he can do to help with pain. Message sent to resident on call via [employer].

## 2023-08-01 ENCOUNTER — OFFICE VISIT (OUTPATIENT)
Dept: ENT CLINIC | Age: 58
End: 2023-08-01

## 2023-08-01 VITALS
WEIGHT: 225 LBS | HEART RATE: 91 BPM | DIASTOLIC BLOOD PRESSURE: 85 MMHG | SYSTOLIC BLOOD PRESSURE: 114 MMHG | HEIGHT: 70 IN | BODY MASS INDEX: 32.21 KG/M2

## 2023-08-01 DIAGNOSIS — G89.18 POST-OP PAIN: ICD-10-CM

## 2023-08-01 DIAGNOSIS — C79.89 METASTATIC SQUAMOUS CELL CARCINOMA TO HEAD AND NECK (HCC): Primary | ICD-10-CM

## 2023-08-01 LAB — SURGICAL PATHOLOGY REPORT: NORMAL

## 2023-08-01 PROCEDURE — 99024 POSTOP FOLLOW-UP VISIT: CPT | Performed by: OTOLARYNGOLOGY

## 2023-08-01 RX ORDER — OXYCODONE HYDROCHLORIDE 10 MG/1
5 TABLET ORAL EVERY 4 HOURS PRN
Qty: 21 TABLET | Refills: 0 | Status: SHIPPED | OUTPATIENT
Start: 2023-08-01 | End: 2023-08-08

## 2023-08-01 NOTE — PROGRESS NOTES
8/1/23: Pt here for 2 wk post op tonsillectomy and partial glossectomy. Having severe pain in throat when swallowing. Has a scratchy feeling and is worse in the am. No difficulty swallowing other than the pain causing difficulty eating/drinking. Change in voice due to saliva build up. Drinks 1.5 bottles of water, no caffeine, and no alcohol. Weight loss of 16lbs since 7/24/23.

## 2023-08-01 NOTE — PROGRESS NOTES
8/1/23: Pt here for 1 wk post op left palatine and lingual tonsillectomy. Having severe pain in throat when swallowing. Has a scratchy feeling and is worse in the am. No difficulty swallowing other than the pain causing difficulty eating/drinking. Change in voice due to saliva build up. Drinks 1.5 bottles of water, no caffeine, and no alcohol. Weight loss of 16lbs since 7/24/23. Left tonsillar fossae well appearing. Scope exam shows well healing left base of tongue with pooling of secretions as expected. More oral pain medication and magic mouthwash sent in or pain. Stick to soft diet for another week. Follow up with Dr. Patricio Currie as scheduled.

## 2023-08-01 NOTE — TELEPHONE ENCOUNTER
Spoke with patient, is schedule to see resident in office today for post op and to discuss pain/treatment further.

## 2023-08-15 ENCOUNTER — CASE MANAGEMENT (OUTPATIENT)
Dept: OTOLARYNGOLOGY | Age: 58
End: 2023-08-15

## 2023-08-15 NOTE — PROGRESS NOTES
Patient discussed at [facility] and Neck multidisciplinary tumor board conference on: 8/15/23  Presenting Physician: Dr. Bhavesh Godfrey  Conference Review Type: Zoom    Summary    59y.o. year old with pT2N3b p16+ oropharyngeal SCC s/p TORS with left radical tonsillectomy, base of tongue resection, and partial pharyngectomy on 7/24/23 and left neck dissection on 6/12/23.      Primary Site: left base of tongue/tonsil region  Histology: p16+ invasive, moderately differentiated, +TIMOTHY, +LVI  National Guidelines Discussed: NCCN    Recommendations  Adjuvant Chemotherapy/Radiation therapy    Liz Lombardi DO,  Resident Physician, PGY-2  Department of Otolaryngology, [facility])

## 2023-08-17 ENCOUNTER — OFFICE VISIT (OUTPATIENT)
Dept: ENT CLINIC | Age: 58
End: 2023-08-17
Payer: COMMERCIAL

## 2023-08-17 VITALS
SYSTOLIC BLOOD PRESSURE: 114 MMHG | HEART RATE: 78 BPM | BODY MASS INDEX: 32.07 KG/M2 | DIASTOLIC BLOOD PRESSURE: 85 MMHG | HEIGHT: 70 IN | WEIGHT: 224 LBS

## 2023-08-17 DIAGNOSIS — R22.1 NECK MASS: ICD-10-CM

## 2023-08-17 DIAGNOSIS — K14.8 TONGUE MASS: ICD-10-CM

## 2023-08-17 DIAGNOSIS — C76.0 HEAD AND NECK CANCER (HCC): ICD-10-CM

## 2023-08-17 DIAGNOSIS — C79.89 METASTATIC SQUAMOUS CELL CARCINOMA TO HEAD AND NECK (HCC): Primary | ICD-10-CM

## 2023-08-17 PROCEDURE — 31575 DIAGNOSTIC LARYNGOSCOPY: CPT | Performed by: OTOLARYNGOLOGY

## 2023-08-17 PROCEDURE — 99024 POSTOP FOLLOW-UP VISIT: CPT | Performed by: OTOLARYNGOLOGY

## 2023-08-17 NOTE — PROGRESS NOTES
7/24/23 - TRANSORAL SURGERY ROBOTIC, RADICAL TONSILLECTOMY,  PARTIAL GLOSSECTOMY, PARTIAL PHARYNGECTOMY    8/17/23: Pt here for partial glossectomy/tonsillectomy. Still having pain, but has gotten better. Difficulty swallowing due to soreness and lack of saliva. No recent changes in voice. Drinks 1.5 bottles of water, no caffeine, and no alcohol. Weight loss of 10 lbs since LOV before surgery 4/20/23.
Plan.  Hx of neck abscess that autodrained with improvement- differential includes abscess from dental origin, infected branchial cleft cyst, less likely malignancy -done  Discussed he needs to be evaluated by dentist for evaluation of dental extractions, referral placed -done  Asked pt to get the CD of his previous CT scans and bring to office for review -done  If persists after dental evaluation, will consider repeat CT scan at next visit -done  Will start on PCN V for possible actinomyces/dental infection- done, no more needed  Follow up in 4-6 weeks for re-evaluation, discussed signs and symptoms on which to return sooner- still with mass present. Not draining anymore  D/w pt and family regarding txment of neck mass and concern if just slowly resolving abscess vs an occult malignancy. Surgical excision could be an option to just remove it to eval for occult malignancy vs removal of abscess walled off. Pt wishes to think about his options and will further discuss at next visit. Pt wishes for it to be removed as it is painful and worried about recurrance or mallignancy. may scope at next visit - done- fullness of left bot  Ct neck with contrast ordered to better eval mass of tongue and any changes in neck to plan for surgery- extent of surgery and whether panendo with tongue biopsy would be warranted. Ct with changes in left neck but no overt uadt mass or changes. -done  Recommend excision of mass to include neck contents and possibly parotid and tonsil as it may be a branchial mass. He understnads will also do a panendo to eval for tongue and tonsil changes. R/B/A of surgery discussed with pt. Pt understood, consent signed, and would like to proceed to surgery. No personal or family history of bleeding or adverse reaction to anesthesia. Pt finally in agreement to have it removed as is his family with him today.   Pt to call if it swells again before surgery as he may require abx   Needs cardiac

## 2023-08-23 PROBLEM — Z01.812 PRE-OPERATIVE LABORATORY EXAMINATION: Status: RESOLVED | Noted: 2023-07-24 | Resolved: 2023-08-23

## 2023-09-29 PROCEDURE — 77014 CHG CT GUIDANCE RADIATION THERAPY FLDS PLACEMENT: CPT | Performed by: RADIOLOGY

## 2023-10-02 PROCEDURE — 77014 CHG CT GUIDANCE RADIATION THERAPY FLDS PLACEMENT: CPT | Performed by: RADIOLOGY

## 2023-10-03 PROCEDURE — 77014 CHG CT GUIDANCE RADIATION THERAPY FLDS PLACEMENT: CPT | Performed by: RADIOLOGY

## 2023-10-04 PROCEDURE — 77014 CHG CT GUIDANCE RADIATION THERAPY FLDS PLACEMENT: CPT | Performed by: RADIOLOGY

## 2023-10-05 PROCEDURE — 77427 RADIATION TX MANAGEMENT X5: CPT | Performed by: RADIOLOGY

## 2023-10-05 PROCEDURE — 77014 CHG CT GUIDANCE RADIATION THERAPY FLDS PLACEMENT: CPT | Performed by: RADIOLOGY

## 2023-10-06 PROCEDURE — 77014 CHG CT GUIDANCE RADIATION THERAPY FLDS PLACEMENT: CPT | Performed by: RADIOLOGY

## 2023-10-09 PROCEDURE — 77014 CHG CT GUIDANCE RADIATION THERAPY FLDS PLACEMENT: CPT | Performed by: RADIOLOGY

## 2023-10-11 PROCEDURE — 77014 CHG CT GUIDANCE RADIATION THERAPY FLDS PLACEMENT: CPT | Performed by: RADIOLOGY

## 2023-10-12 PROCEDURE — 77014 CHG CT GUIDANCE RADIATION THERAPY FLDS PLACEMENT: CPT | Performed by: RADIOLOGY

## 2023-10-12 PROCEDURE — 77427 RADIATION TX MANAGEMENT X5: CPT | Performed by: RADIOLOGY

## 2023-10-13 PROCEDURE — 77014 CHG CT GUIDANCE RADIATION THERAPY FLDS PLACEMENT: CPT | Performed by: RADIOLOGY

## 2023-10-16 PROCEDURE — 77014 CHG CT GUIDANCE RADIATION THERAPY FLDS PLACEMENT: CPT | Performed by: RADIOLOGY

## 2023-10-17 ENCOUNTER — OFFICE VISIT (OUTPATIENT)
Dept: ENT CLINIC | Age: 58
End: 2023-10-17
Payer: COMMERCIAL

## 2023-10-17 VITALS
DIASTOLIC BLOOD PRESSURE: 86 MMHG | SYSTOLIC BLOOD PRESSURE: 121 MMHG | BODY MASS INDEX: 28.92 KG/M2 | HEART RATE: 101 BPM | WEIGHT: 202 LBS | HEIGHT: 70 IN

## 2023-10-17 DIAGNOSIS — C76.0 HEAD AND NECK CANCER (HCC): ICD-10-CM

## 2023-10-17 DIAGNOSIS — R49.0 DYSPHONIA: ICD-10-CM

## 2023-10-17 DIAGNOSIS — R13.12 OROPHARYNGEAL DYSPHAGIA: ICD-10-CM

## 2023-10-17 DIAGNOSIS — C79.89 METASTATIC SQUAMOUS CELL CARCINOMA TO HEAD AND NECK (HCC): ICD-10-CM

## 2023-10-17 DIAGNOSIS — R13.10 ODYNOPHAGIA: ICD-10-CM

## 2023-10-17 DIAGNOSIS — E46 PROTEIN MALNUTRITION (HCC): ICD-10-CM

## 2023-10-17 DIAGNOSIS — T66.XXXA RADIATION INJURY, INITIAL ENCOUNTER: Primary | ICD-10-CM

## 2023-10-17 DIAGNOSIS — K13.79 MOUTH PAIN: ICD-10-CM

## 2023-10-17 PROCEDURE — 77014 CHG CT GUIDANCE RADIATION THERAPY FLDS PLACEMENT: CPT | Performed by: RADIOLOGY

## 2023-10-17 PROCEDURE — 99214 OFFICE O/P EST MOD 30 MIN: CPT | Performed by: OTOLARYNGOLOGY

## 2023-10-17 PROCEDURE — 3074F SYST BP LT 130 MM HG: CPT | Performed by: OTOLARYNGOLOGY

## 2023-10-17 PROCEDURE — 3078F DIAST BP <80 MM HG: CPT | Performed by: OTOLARYNGOLOGY

## 2023-10-17 PROCEDURE — 31575 DIAGNOSTIC LARYNGOSCOPY: CPT | Performed by: OTOLARYNGOLOGY

## 2023-10-17 RX ORDER — FLUCONAZOLE 100 MG/1
TABLET ORAL
COMMUNITY
Start: 2023-10-02

## 2023-10-17 RX ORDER — PILOCARPINE HYDROCHLORIDE 5 MG/1
TABLET, FILM COATED ORAL
COMMUNITY
Start: 2023-10-02

## 2023-10-17 RX ORDER — PROCHLORPERAZINE MALEATE 10 MG
TABLET ORAL
COMMUNITY
Start: 2023-09-18

## 2023-10-17 NOTE — PATIENT INSTRUCTIONS
Dr. Ramiro Santiago and Dyan Pallas [address] [facility], [address] [facility] in the [address] [facility] parking lot off of [address]. Enter through [employer]. Walk into lobby and take the elevator to floor 1R. Proceed to the desk for check in. Call [phone] if any questions about location the day of your appointment. Call [phone] with any clinical questions for Dr. Claros Clarity staff.

## 2023-10-17 NOTE — PROGRESS NOTES
10/17/23: Pt here for 2 mo follow up partial glossectomy/tonsilectomy. Currently being treated for a tongue infection. Having soreness in mouth and neck pain due to radiation burns. Difficulty swallowing due to pain, is being scheduled for PEG tube placement due to weight loss. Mentions talking funny due to thick saliva buildup on roof of mouth. Drinks 1.5 bottles of water, no caffeine, and no alcohol. Weight loss of 22lbs since LOV 8/17/23.
left bot  Ct neck with contrast ordered to better eval mass of tongue and any changes in neck to plan for surgery- extent of surgery and whether panendo with tongue biopsy would be warranted. Ct with changes in left neck but no overt uadt mass or changes. -done  Recommend excision of mass to include neck contents and possibly parotid and tonsil as it may be a branchial mass. He understnads will also do a panendo to eval for tongue and tonsil changes. R/B/A of surgery discussed with pt. Pt understood, consent signed, and would like to proceed to surgery. No personal or family history of bleeding or adverse reaction to anesthesia. Pt finally in agreement to have it removed as is his family with him today. 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  Dory Snow M.D., Ph.D., [address] [place] [address]   Department of Otolaryngology-Head and Neck Surgery  Chief of Head & Neck Surgical Oncology  Director Head & Neck Oncology Service Line

## 2023-10-18 PROCEDURE — 77014 CHG CT GUIDANCE RADIATION THERAPY FLDS PLACEMENT: CPT | Performed by: RADIOLOGY

## 2023-10-19 PROCEDURE — 77014 CHG CT GUIDANCE RADIATION THERAPY FLDS PLACEMENT: CPT | Performed by: RADIOLOGY

## 2023-10-20 PROCEDURE — 77014 CHG CT GUIDANCE RADIATION THERAPY FLDS PLACEMENT: CPT | Performed by: RADIOLOGY

## 2023-10-20 PROCEDURE — 77427 RADIATION TX MANAGEMENT X5: CPT | Performed by: RADIOLOGY

## 2023-10-23 PROCEDURE — 77014 CHG CT GUIDANCE RADIATION THERAPY FLDS PLACEMENT: CPT | Performed by: RADIOLOGY

## 2023-10-24 PROCEDURE — 77014 CHG CT GUIDANCE RADIATION THERAPY FLDS PLACEMENT: CPT | Performed by: RADIOLOGY

## 2023-10-25 PROCEDURE — 77014 CHG CT GUIDANCE RADIATION THERAPY FLDS PLACEMENT: CPT | Performed by: RADIOLOGY

## 2023-10-26 PROCEDURE — 77014 CHG CT GUIDANCE RADIATION THERAPY FLDS PLACEMENT: CPT | Performed by: RADIOLOGY

## 2023-10-27 PROCEDURE — 77427 RADIATION TX MANAGEMENT X5: CPT | Performed by: RADIOLOGY

## 2023-10-27 PROCEDURE — 77014 CHG CT GUIDANCE RADIATION THERAPY FLDS PLACEMENT: CPT | Performed by: RADIOLOGY

## 2023-10-30 PROCEDURE — 77014 CHG CT GUIDANCE RADIATION THERAPY FLDS PLACEMENT: CPT | Performed by: RADIOLOGY

## 2023-10-31 PROCEDURE — 77014 CHG CT GUIDANCE RADIATION THERAPY FLDS PLACEMENT: CPT | Performed by: RADIOLOGY

## 2023-11-01 PROCEDURE — 77014 CHG CT GUIDANCE RADIATION THERAPY FLDS PLACEMENT: CPT | Performed by: RADIOLOGY

## 2023-11-02 PROCEDURE — 77014 CHG CT GUIDANCE RADIATION THERAPY FLDS PLACEMENT: CPT | Performed by: RADIOLOGY

## 2023-11-02 PROCEDURE — 77427 RADIATION TX MANAGEMENT X5: CPT | Performed by: RADIOLOGY

## 2023-11-15 ENCOUNTER — APPOINTMENT (OUTPATIENT)
Dept: CT IMAGING | Age: 58
DRG: 314 | End: 2023-11-15
Attending: EMERGENCY MEDICINE
Payer: OTHER GOVERNMENT

## 2023-11-15 PROBLEM — Z78.9 PROBLEM WITH VASCULAR ACCESS: Status: ACTIVE | Noted: 2023-11-15

## 2023-11-15 LAB
ABO + RH BLD: NORMAL
ALBUMIN SERPL-MCNC: 3.2 G/DL (ref 3.5–5.2)
ALP SERPL-CCNC: 79 U/L (ref 40–129)
ALT SERPL-CCNC: 27 U/L (ref 0–40)
ANION GAP SERPL CALCULATED.3IONS-SCNC: 24 MMOL/L (ref 7–16)
ARM BAND NUMBER: NORMAL
AST SERPL-CCNC: 20 U/L (ref 0–39)
BASOPHILS # BLD: 0.02 K/UL (ref 0–0.2)
BASOPHILS NFR BLD: 0 % (ref 0–2)
BILIRUB SERPL-MCNC: 0.5 MG/DL (ref 0–1.2)
BLOOD BANK SAMPLE EXPIRATION: NORMAL
BLOOD GROUP ANTIBODIES SERPL: NEGATIVE
BUN SERPL-MCNC: 24 MG/DL (ref 6–23)
CALCIUM SERPL-MCNC: 8.9 MG/DL (ref 8.6–10.2)
CHLORIDE SERPL-SCNC: 99 MMOL/L (ref 98–107)
CO2 SERPL-SCNC: 17 MMOL/L (ref 22–29)
CREAT SERPL-MCNC: 1.1 MG/DL (ref 0.7–1.2)
EOSINOPHIL # BLD: 0.01 K/UL (ref 0.05–0.5)
EOSINOPHILS RELATIVE PERCENT: 0 % (ref 0–6)
ERYTHROCYTE [DISTWIDTH] IN BLOOD BY AUTOMATED COUNT: 15.2 % (ref 11.5–15)
GFR SERPL CREATININE-BSD FRML MDRD: >60 ML/MIN/1.73M2
GLUCOSE SERPL-MCNC: 114 MG/DL (ref 74–99)
HCT VFR BLD AUTO: 29.1 % (ref 37–54)
HGB BLD-MCNC: 10 G/DL (ref 12.5–16.5)
IMM GRANULOCYTES # BLD AUTO: 0.05 K/UL (ref 0–0.58)
IMM GRANULOCYTES NFR BLD: 1 % (ref 0–5)
INR PPP: 1.2
LYMPHOCYTES NFR BLD: 1.34 K/UL (ref 1.5–4)
LYMPHOCYTES RELATIVE PERCENT: 23 % (ref 20–42)
MCH RBC QN AUTO: 32.5 PG (ref 26–35)
MCHC RBC AUTO-ENTMCNC: 34.4 G/DL (ref 32–34.5)
MCV RBC AUTO: 94.5 FL (ref 80–99.9)
MONOCYTES NFR BLD: 0.79 K/UL (ref 0.1–0.95)
MONOCYTES NFR BLD: 13 % (ref 2–12)
NEUTROPHILS NFR BLD: 63 % (ref 43–80)
NEUTS SEG NFR BLD: 3.75 K/UL (ref 1.8–7.3)
PARTIAL THROMBOPLASTIN TIME: 31.6 SEC (ref 24.5–35.1)
PLATELET # BLD AUTO: 241 K/UL (ref 130–450)
PMV BLD AUTO: 11.3 FL (ref 7–12)
POTASSIUM SERPL-SCNC: 4.6 MMOL/L (ref 3.5–5)
PROT SERPL-MCNC: 7 G/DL (ref 6.4–8.3)
PROTHROMBIN TIME: 13.3 SEC (ref 9.3–12.4)
RBC # BLD AUTO: 3.08 M/UL (ref 3.8–5.8)
SODIUM SERPL-SCNC: 140 MMOL/L (ref 132–146)
WBC OTHER # BLD: 6 K/UL (ref 4.5–11.5)

## 2023-11-15 PROCEDURE — 99285 EMERGENCY DEPT VISIT HI MDM: CPT

## 2023-11-15 PROCEDURE — 71250 CT THORAX DX C-: CPT

## 2023-11-15 PROCEDURE — 86900 BLOOD TYPING SEROLOGIC ABO: CPT

## 2023-11-15 PROCEDURE — 2580000003 HC RX 258: Performed by: EMERGENCY MEDICINE

## 2023-11-15 PROCEDURE — 74176 CT ABD & PELVIS W/O CONTRAST: CPT

## 2023-11-15 PROCEDURE — 86850 RBC ANTIBODY SCREEN: CPT

## 2023-11-15 PROCEDURE — 85025 COMPLETE CBC W/AUTO DIFF WBC: CPT

## 2023-11-15 PROCEDURE — 96360 HYDRATION IV INFUSION INIT: CPT

## 2023-11-15 PROCEDURE — 80053 COMPREHEN METABOLIC PANEL: CPT

## 2023-11-15 PROCEDURE — 85730 THROMBOPLASTIN TIME PARTIAL: CPT

## 2023-11-15 PROCEDURE — 96361 HYDRATE IV INFUSION ADD-ON: CPT

## 2023-11-15 PROCEDURE — 85610 PROTHROMBIN TIME: CPT

## 2023-11-15 PROCEDURE — 86901 BLOOD TYPING SEROLOGIC RH(D): CPT

## 2023-11-15 RX ORDER — 0.9 % SODIUM CHLORIDE 0.9 %
1000 INTRAVENOUS SOLUTION INTRAVENOUS ONCE
Status: COMPLETED | OUTPATIENT
Start: 2023-11-15 | End: 2023-11-16

## 2023-11-15 RX ORDER — SODIUM CHLORIDE 9 MG/ML
INJECTION, SOLUTION INTRAVENOUS CONTINUOUS
Status: DISCONTINUED | OUTPATIENT
Start: 2023-11-15 | End: 2023-11-16 | Stop reason: CLARIF

## 2023-11-15 RX ADMIN — SODIUM CHLORIDE 1000 ML: 9 INJECTION, SOLUTION INTRAVENOUS at 21:24

## 2023-11-15 RX ADMIN — SODIUM CHLORIDE: 9 INJECTION, SOLUTION INTRAVENOUS at 21:25

## 2023-11-15 ASSESSMENT — PAIN - FUNCTIONAL ASSESSMENT: PAIN_FUNCTIONAL_ASSESSMENT: 0-10

## 2023-11-15 ASSESSMENT — LIFESTYLE VARIABLES: HOW MANY STANDARD DRINKS CONTAINING ALCOHOL DO YOU HAVE ON A TYPICAL DAY: PATIENT DOES NOT DRINK

## 2023-11-16 ENCOUNTER — HOSPITAL ENCOUNTER (INPATIENT)
Age: 58
LOS: 2 days | Discharge: HOME | DRG: 314 | End: 2023-11-18
Attending: INTERNAL MEDICINE | Admitting: INTERNAL MEDICINE
Payer: OTHER GOVERNMENT

## 2023-11-16 DIAGNOSIS — K59.00 CONSTIPATION, UNSPECIFIED CONSTIPATION TYPE: ICD-10-CM

## 2023-11-16 DIAGNOSIS — Z45.2 ENCOUNTER FOR CARE RELATED TO VASCULAR ACCESS PORT: Primary | ICD-10-CM

## 2023-11-16 LAB
ANION GAP SERPL CALCULATED.3IONS-SCNC: 16 MMOL/L (ref 7–16)
BASOPHILS # BLD: 0.01 K/UL (ref 0–0.2)
BASOPHILS NFR BLD: 0 % (ref 0–2)
BUN SERPL-MCNC: 24 MG/DL (ref 6–23)
CALCIUM SERPL-MCNC: 7.7 MG/DL (ref 8.6–10.2)
CHLORIDE SERPL-SCNC: 101 MMOL/L (ref 98–107)
CO2 SERPL-SCNC: 20 MMOL/L (ref 22–29)
CREAT SERPL-MCNC: 1 MG/DL (ref 0.7–1.2)
EOSINOPHIL # BLD: 0.01 K/UL (ref 0.05–0.5)
EOSINOPHILS RELATIVE PERCENT: 0 % (ref 0–6)
ERYTHROCYTE [DISTWIDTH] IN BLOOD BY AUTOMATED COUNT: 15.4 % (ref 11.5–15)
GFR SERPL CREATININE-BSD FRML MDRD: >60 ML/MIN/1.73M2
GLUCOSE SERPL-MCNC: 98 MG/DL (ref 74–99)
HCT VFR BLD AUTO: 25.7 % (ref 37–54)
HGB BLD-MCNC: 8.6 G/DL (ref 12.5–16.5)
IMM GRANULOCYTES # BLD AUTO: 0.05 K/UL (ref 0–0.58)
IMM GRANULOCYTES NFR BLD: 1 % (ref 0–5)
LYMPHOCYTES NFR BLD: 0.84 K/UL (ref 1.5–4)
LYMPHOCYTES RELATIVE PERCENT: 18 % (ref 20–42)
MCH RBC QN AUTO: 32.3 PG (ref 26–35)
MCHC RBC AUTO-ENTMCNC: 33.5 G/DL (ref 32–34.5)
MCV RBC AUTO: 96.6 FL (ref 80–99.9)
MONOCYTES NFR BLD: 0.7 K/UL (ref 0.1–0.95)
MONOCYTES NFR BLD: 15 % (ref 2–12)
NEUTROPHILS NFR BLD: 67 % (ref 43–80)
NEUTS SEG NFR BLD: 3.19 K/UL (ref 1.8–7.3)
PHOSPHATE SERPL-MCNC: 3.5 MG/DL (ref 2.5–4.5)
PLATELET # BLD AUTO: 152 K/UL (ref 130–450)
PMV BLD AUTO: 10.7 FL (ref 7–12)
POTASSIUM SERPL-SCNC: 4.4 MMOL/L (ref 3.5–5)
RBC # BLD AUTO: 2.66 M/UL (ref 3.8–5.8)
SODIUM SERPL-SCNC: 137 MMOL/L (ref 132–146)
WBC OTHER # BLD: 4.8 K/UL (ref 4.5–11.5)

## 2023-11-16 PROCEDURE — 84100 ASSAY OF PHOSPHORUS: CPT

## 2023-11-16 PROCEDURE — 6370000000 HC RX 637 (ALT 250 FOR IP): Performed by: INTERNAL MEDICINE

## 2023-11-16 PROCEDURE — 85025 COMPLETE CBC W/AUTO DIFF WBC: CPT

## 2023-11-16 PROCEDURE — 2580000003 HC RX 258: Performed by: INTERNAL MEDICINE

## 2023-11-16 PROCEDURE — 0JH63XZ INSERTION OF TUNNELED VASCULAR ACCESS DEVICE INTO CHEST SUBCUTANEOUS TISSUE AND FASCIA, PERCUTANEOUS APPROACH: ICD-10-PCS | Performed by: INTERNAL MEDICINE

## 2023-11-16 PROCEDURE — 02PYX3Z REMOVAL OF INFUSION DEVICE FROM GREAT VESSEL, EXTERNAL APPROACH: ICD-10-PCS | Performed by: INTERNAL MEDICINE

## 2023-11-16 PROCEDURE — 1200000000 HC SEMI PRIVATE

## 2023-11-16 PROCEDURE — 99222 1ST HOSP IP/OBS MODERATE 55: CPT | Performed by: INTERNAL MEDICINE

## 2023-11-16 PROCEDURE — 0JPT3XZ REMOVAL OF TUNNELED VASCULAR ACCESS DEVICE FROM TRUNK SUBCUTANEOUS TISSUE AND FASCIA, PERCUTANEOUS APPROACH: ICD-10-PCS | Performed by: INTERNAL MEDICINE

## 2023-11-16 PROCEDURE — 80048 BASIC METABOLIC PNL TOTAL CA: CPT

## 2023-11-16 PROCEDURE — 02HV33Z INSERTION OF INFUSION DEVICE INTO SUPERIOR VENA CAVA, PERCUTANEOUS APPROACH: ICD-10-PCS | Performed by: INTERNAL MEDICINE

## 2023-11-16 RX ORDER — SODIUM CHLORIDE 9 MG/ML
INJECTION, SOLUTION INTRAVENOUS CONTINUOUS
Status: ACTIVE | OUTPATIENT
Start: 2023-11-16 | End: 2023-11-18

## 2023-11-16 RX ORDER — OXYCODONE HYDROCHLORIDE AND ACETAMINOPHEN 5; 325 MG/1; MG/1
1 TABLET ORAL EVERY 6 HOURS PRN
COMMUNITY

## 2023-11-16 RX ORDER — ATORVASTATIN CALCIUM 80 MG/1
80 TABLET, FILM COATED ORAL NIGHTLY
COMMUNITY

## 2023-11-16 RX ORDER — ATORVASTATIN CALCIUM 40 MG/1
40 TABLET, FILM COATED ORAL NIGHTLY
Status: DISCONTINUED | OUTPATIENT
Start: 2023-11-16 | End: 2023-11-18 | Stop reason: HOSPADM

## 2023-11-16 RX ORDER — PROCHLORPERAZINE MALEATE 10 MG
5 TABLET ORAL EVERY 6 HOURS PRN
Status: DISCONTINUED | OUTPATIENT
Start: 2023-11-16 | End: 2023-11-18 | Stop reason: HOSPADM

## 2023-11-16 RX ORDER — SODIUM CHLORIDE 0.9 % (FLUSH) 0.9 %
5-40 SYRINGE (ML) INJECTION EVERY 12 HOURS SCHEDULED
Status: DISCONTINUED | OUTPATIENT
Start: 2023-11-16 | End: 2023-11-18 | Stop reason: HOSPADM

## 2023-11-16 RX ORDER — POLYETHYLENE GLYCOL 3350 17 G/17G
17 POWDER, FOR SOLUTION ORAL DAILY PRN
Status: DISCONTINUED | OUTPATIENT
Start: 2023-11-16 | End: 2023-11-18 | Stop reason: HOSPADM

## 2023-11-16 RX ORDER — LORAZEPAM 0.5 MG/1
1 TABLET ORAL SEE ADMIN INSTRUCTIONS
COMMUNITY

## 2023-11-16 RX ORDER — MAGNESIUM SULFATE IN WATER 40 MG/ML
2000 INJECTION, SOLUTION INTRAVENOUS PRN
Status: DISCONTINUED | OUTPATIENT
Start: 2023-11-16 | End: 2023-11-18 | Stop reason: HOSPADM

## 2023-11-16 RX ORDER — LISINOPRIL 5 MG/1
5 TABLET ORAL DAILY
COMMUNITY

## 2023-11-16 RX ORDER — LISINOPRIL 10 MG/1
5 TABLET ORAL DAILY
Status: DISCONTINUED | OUTPATIENT
Start: 2023-11-16 | End: 2023-11-18 | Stop reason: HOSPADM

## 2023-11-16 RX ORDER — ONDANSETRON 2 MG/ML
4 INJECTION INTRAMUSCULAR; INTRAVENOUS EVERY 6 HOURS PRN
Status: DISCONTINUED | OUTPATIENT
Start: 2023-11-16 | End: 2023-11-18 | Stop reason: HOSPADM

## 2023-11-16 RX ORDER — ONDANSETRON 4 MG/1
4 TABLET, ORALLY DISINTEGRATING ORAL EVERY 8 HOURS PRN
Status: DISCONTINUED | OUTPATIENT
Start: 2023-11-16 | End: 2023-11-18 | Stop reason: HOSPADM

## 2023-11-16 RX ORDER — POTASSIUM CHLORIDE 20 MEQ/1
40 TABLET, EXTENDED RELEASE ORAL PRN
Status: DISCONTINUED | OUTPATIENT
Start: 2023-11-16 | End: 2023-11-18 | Stop reason: HOSPADM

## 2023-11-16 RX ORDER — CARVEDILOL 3.12 MG/1
3.12 TABLET ORAL 2 TIMES DAILY WITH MEALS
Status: DISCONTINUED | OUTPATIENT
Start: 2023-11-16 | End: 2023-11-18 | Stop reason: HOSPADM

## 2023-11-16 RX ORDER — SODIUM CHLORIDE 0.9 % (FLUSH) 0.9 %
5-40 SYRINGE (ML) INJECTION PRN
Status: DISCONTINUED | OUTPATIENT
Start: 2023-11-16 | End: 2023-11-18 | Stop reason: HOSPADM

## 2023-11-16 RX ORDER — SODIUM CHLORIDE 9 MG/ML
INJECTION, SOLUTION INTRAVENOUS PRN
Status: DISCONTINUED | OUTPATIENT
Start: 2023-11-16 | End: 2023-11-18 | Stop reason: HOSPADM

## 2023-11-16 RX ORDER — POTASSIUM CHLORIDE 7.45 MG/ML
10 INJECTION INTRAVENOUS PRN
Status: DISCONTINUED | OUTPATIENT
Start: 2023-11-16 | End: 2023-11-18 | Stop reason: HOSPADM

## 2023-11-16 RX ORDER — PILOCARPINE HYDROCHLORIDE 5 MG/1
5 TABLET, FILM COATED ORAL NIGHTLY
Status: DISCONTINUED | OUTPATIENT
Start: 2023-11-16 | End: 2023-11-17 | Stop reason: SDUPTHER

## 2023-11-16 RX ADMIN — CARVEDILOL 3.12 MG: 3.12 TABLET, FILM COATED ORAL at 16:22

## 2023-11-16 RX ADMIN — ATORVASTATIN CALCIUM 40 MG: 40 TABLET, FILM COATED ORAL at 20:44

## 2023-11-16 RX ADMIN — SODIUM CHLORIDE: 9 INJECTION, SOLUTION INTRAVENOUS at 03:30

## 2023-11-16 ASSESSMENT — PAIN - FUNCTIONAL ASSESSMENT: PAIN_FUNCTIONAL_ASSESSMENT: NONE - DENIES PAIN

## 2023-11-16 NOTE — PLAN OF CARE

## 2023-11-16 NOTE — PROGRESS NOTES
4 Eyes Skin Assessment     NAME:  Tia Sage  YOB: 1965  MEDICAL RECORD NUMBER:  [id number]    The patient is being assessed for  Admission    I agree that at least one RN has performed a thorough Head to Toe Skin Assessment on the patient. ALL assessment sites listed below have been assessed. Areas assessed by both nurses:    Head, Face, Ears, Shoulders, Back, Chest, Arms, Elbows, Hands, Sacrum. Buttock, Coccyx, Ischium, Legs. Feet and Heels, and Under Medical Devices         Does the Patient have a Wound?  No noted wound(s)  Radiation Burn to Left side of neck, clavicle       Momo Prevention initiated by RN: No  Wound Care Orders initiated by RN: No    Pressure Injury (Stage 3,4, Unstageable, DTI, NWPT, and Complex wounds) if present, place Wound referral order by RN under : No    New Ostomies, if present place, Ostomy referral order under : No     Nurse 1 eSignature: Electronically signed by Karey Ortega RN on 11/16/23 at 6:44 PM EST    **SHARE this note so that the co-signing nurse can place an eSignature**    Nurse 2 eSignature: Electronically signed by Pablo Lawrence RN on 11/16/23 at 6:49 PM EST

## 2023-11-16 NOTE — H&P
History & Physicial  11/16/23  Primary Care:  Oneil Moore MD  [address] [place] / [facility] [address]        Chief Complaint   Patient presents with    Vascular Access Problem     Right-sided port catheter migrated to the left main pulm artery, placed last week. Shortness of Breath     SOB, looks pale, hx CA throat, getting radiation and chemo. Leg Swelling     And numbness since they put port in. HPI:    Jaylen Redman was being seen earlier 1 day prior. His Mediport was not able to be used. An x-ray revealed that a piece of the Mediport had broke off and had lodged itself distally in the vessel. He was sent to the emergency department because of vascular access issues. Prior to Visit Medications    Medication Sig Taking?  Authorizing Provider   pilocarpine (SALAGEN) 5 MG tablet Take by mouth  Britt Lozano MD   prochlorperazine (COMPAZINE) 10 MG tablet TAKE ONE TABLET BY MOUTH EVERY 6 HOURS AS NEEDED for nausea and vomiting  Britt Lozano MD   fluconazole (DIFLUCAN) 100 MG tablet Take by mouth  Britt Lozano MD   ondansetron (ZOFRAN-ODT) 4 MG disintegrating tablet Take 1 tablet by mouth 3 times daily as needed for Nausea or Vomiting  Jodi Martinez,    atorvastatin (LIPITOR) 80 MG tablet TAKE ONE TABLET BY MOUTH DAILY  Patient not taking: Reported on 10/17/2023  Mehnaz SILVA DO   lisinopril (PRINIVIL;ZESTRIL) 5 MG tablet TAKE ONE TABLET BY MOUTH DAILY  Mehnaz SILVA DO   carvedilol (COREG) 3.125 MG tablet Take 1 tablet by mouth 2 times daily (with meals)  Jose Finley DO   Cholecalciferol (VITAMIN D) 2000 units CAPS capsule Take 2,000 Units by mouth daily  ProviderBritt MD     Social History     Tobacco Use    Smoking status: Never    Smokeless tobacco: Never   Vaping Use    Vaping Use: Never used   Substance Use Topics    Alcohol use: No     Comment: no alcohol x 10 years;    Drug use: No     Family History   Problem Relation Age of Onset Heart Attack Father     High Blood Pressure Father     Emphysema Mother      Past Surgical History:   Procedure Laterality Date    CARDIAC CATHETERIZATION  9/29/15    LHC/PCI w/JENNIFFER to LAD    CARDIOVERSION  9/29/15    HERNIA REPAIR      umbilical  age 11    LARYNGOSCOPY N/A 6/12/2023    DIRECT LARYNGOSCOPY performed by Hema Church MD at [facility] N/A 7/24/2023    LARYNGOSCOPY performed by Hema Church MD at [address] [place] [address] 7/24/2023    TRANSORAL SURGERY ROBOTIC, RADICAL TONSILLECTOMY,  PARTIAL GLOSSECTOMY, PARTIAL PHARYNGECTOMY performed by Hema Church MD at [address] [place] [address] 7/24/2023    MOUTH LESION BIOPSY EXCISION performed by Hema Church MD at [facility] 6/12/2023    excision neck fistula, excision multiple separate neck masses, adjacent tissue transfer  performed by Hema Church MD at [address] [place] [address] 7/24/2023    TONSILLECTOMY performed by Hema Church MD at [address] [place] [address]     Past Medical History:   Diagnosis Date    CAD (coronary artery disease)     Cancer ([address])     Cardiac arrest ([address])     Hyperlipidemia     STEMI (ST elevation myocardial infarction) ([address])      Review of Systems  Physical Exam  Constitutional:       Appearance: He is ill-appearing. Comments: Child was evaluated while sitting in the emergency department waiting area. I explained to him I needed to see him so I could put orders in so he could get a bed. There was no space in the emergency department proper to see him at that time. Because of the nature of his presentation it was imperative that I evaluate him. He did not object to this. Cardiovascular:      Rate and Rhythm: Normal rate and regular rhythm. Pulmonary:      Effort: Pulmonary effort is normal.      Breath sounds: Normal breath sounds. Comments: Right chest wall port  Lungs appear to be clear  Abdominal:      Comments: Feeding tube   Musculoskeletal:      Right lower leg: Edema present.

## 2023-11-16 NOTE — ED NOTES
Nurse to nurse report called to floor, RN 8WE     Davion Agudelo [place], [place]  11/16/23 2120

## 2023-11-17 ENCOUNTER — APPOINTMENT (OUTPATIENT)
Dept: INTERVENTIONAL RADIOLOGY/VASCULAR | Age: 58
DRG: 314 | End: 2023-11-17
Payer: OTHER GOVERNMENT

## 2023-11-17 PROBLEM — E43 SEVERE PROTEIN-CALORIE MALNUTRITION (HCC): Chronic | Status: ACTIVE | Noted: 2023-11-17

## 2023-11-17 PROBLEM — Z45.2 ENCOUNTER FOR CARE RELATED TO VASCULAR ACCESS PORT: Status: ACTIVE | Noted: 2023-11-17

## 2023-11-17 LAB
ANION GAP SERPL CALCULATED.3IONS-SCNC: 14 MMOL/L (ref 7–16)
BUN SERPL-MCNC: 22 MG/DL (ref 6–23)
CALCIUM SERPL-MCNC: 7.7 MG/DL (ref 8.6–10.2)
CHLORIDE SERPL-SCNC: 103 MMOL/L (ref 98–107)
CO2 SERPL-SCNC: 21 MMOL/L (ref 22–29)
CREAT SERPL-MCNC: 0.9 MG/DL (ref 0.7–1.2)
GFR SERPL CREATININE-BSD FRML MDRD: >60 ML/MIN/1.73M2
GLUCOSE SERPL-MCNC: 78 MG/DL (ref 74–99)
POTASSIUM SERPL-SCNC: 4.2 MMOL/L (ref 3.5–5)
SODIUM SERPL-SCNC: 138 MMOL/L (ref 132–146)

## 2023-11-17 PROCEDURE — 36590 REMOVAL TUNNELED CV CATH: CPT

## 2023-11-17 PROCEDURE — 36410 VNPNXR 3YR/> PHY/QHP DX/THER: CPT

## 2023-11-17 PROCEDURE — 2580000003 HC RX 258: Performed by: RADIOLOGY

## 2023-11-17 PROCEDURE — 75827 VEIN X-RAY CHEST: CPT

## 2023-11-17 PROCEDURE — 99232 SBSQ HOSP IP/OBS MODERATE 35: CPT | Performed by: INTERNAL MEDICINE

## 2023-11-17 PROCEDURE — 87077 CULTURE AEROBIC IDENTIFY: CPT

## 2023-11-17 PROCEDURE — 87075 CULTR BACTERIA EXCEPT BLOOD: CPT

## 2023-11-17 PROCEDURE — 36561 INSERT TUNNELED CV CATH: CPT

## 2023-11-17 PROCEDURE — 87070 CULTURE OTHR SPECIMN AEROBIC: CPT

## 2023-11-17 PROCEDURE — 2580000003 HC RX 258: Performed by: INTERNAL MEDICINE

## 2023-11-17 PROCEDURE — C1769 GUIDE WIRE: HCPCS

## 2023-11-17 PROCEDURE — 77001 FLUOROGUIDE FOR VEIN DEVICE: CPT

## 2023-11-17 PROCEDURE — 6370000000 HC RX 637 (ALT 250 FOR IP): Performed by: INTERNAL MEDICINE

## 2023-11-17 PROCEDURE — 87205 SMEAR GRAM STAIN: CPT

## 2023-11-17 PROCEDURE — 36010 PLACE CATHETER IN VEIN: CPT

## 2023-11-17 PROCEDURE — C1751 CATH, INF, PER/CENT/MIDLINE: HCPCS

## 2023-11-17 PROCEDURE — 87071 CULTURE AEROBIC QUANT OTHER: CPT

## 2023-11-17 PROCEDURE — 76937 US GUIDE VASCULAR ACCESS: CPT

## 2023-11-17 PROCEDURE — 1200000000 HC SEMI PRIVATE

## 2023-11-17 PROCEDURE — 99254 IP/OBS CNSLTJ NEW/EST MOD 60: CPT | Performed by: THORACIC SURGERY (CARDIOTHORACIC VASCULAR SURGERY)

## 2023-11-17 PROCEDURE — 80048 BASIC METABOLIC PNL TOTAL CA: CPT

## 2023-11-17 PROCEDURE — 6360000002 HC RX W HCPCS: Performed by: RADIOLOGY

## 2023-11-17 PROCEDURE — 37197 REMOVE INTRVAS FOREIGN BODY: CPT

## 2023-11-17 RX ORDER — DIPHENHYDRAMINE HYDROCHLORIDE 50 MG/ML
INJECTION INTRAMUSCULAR; INTRAVENOUS PRN
Status: COMPLETED | OUTPATIENT
Start: 2023-11-17 | End: 2023-11-17

## 2023-11-17 RX ORDER — SODIUM CHLORIDE 0.9 % (FLUSH) 0.9 %
5-40 SYRINGE (ML) INJECTION EVERY 12 HOURS SCHEDULED
Status: DISCONTINUED | OUTPATIENT
Start: 2023-11-17 | End: 2023-11-18 | Stop reason: HOSPADM

## 2023-11-17 RX ORDER — SODIUM CHLORIDE 0.9 % (FLUSH) 0.9 %
5-40 SYRINGE (ML) INJECTION PRN
Status: DISCONTINUED | OUTPATIENT
Start: 2023-11-17 | End: 2023-11-18 | Stop reason: HOSPADM

## 2023-11-17 RX ORDER — HEPARIN 100 UNIT/ML
1 SYRINGE INTRAVENOUS PRN
Status: DISCONTINUED | OUTPATIENT
Start: 2023-11-17 | End: 2023-11-18 | Stop reason: HOSPADM

## 2023-11-17 RX ORDER — MIDAZOLAM HYDROCHLORIDE 2 MG/2ML
INJECTION, SOLUTION INTRAMUSCULAR; INTRAVENOUS PRN
Status: COMPLETED | OUTPATIENT
Start: 2023-11-17 | End: 2023-11-17

## 2023-11-17 RX ORDER — FENTANYL CITRATE 50 UG/ML
INJECTION, SOLUTION INTRAMUSCULAR; INTRAVENOUS PRN
Status: COMPLETED | OUTPATIENT
Start: 2023-11-17 | End: 2023-11-17

## 2023-11-17 RX ORDER — HEPARIN 100 UNIT/ML
1 SYRINGE INTRAVENOUS EVERY 12 HOURS SCHEDULED
Status: DISCONTINUED | OUTPATIENT
Start: 2023-11-17 | End: 2023-11-18 | Stop reason: HOSPADM

## 2023-11-17 RX ORDER — SODIUM CHLORIDE 9 MG/ML
INJECTION, SOLUTION INTRAVENOUS PRN
Status: DISCONTINUED | OUTPATIENT
Start: 2023-11-17 | End: 2023-11-18 | Stop reason: HOSPADM

## 2023-11-17 RX ADMIN — FENTANYL CITRATE 25 MCG: 50 INJECTION, SOLUTION INTRAMUSCULAR; INTRAVENOUS at 19:06

## 2023-11-17 RX ADMIN — CEFAZOLIN 2000 MG: 2 INJECTION, POWDER, FOR SOLUTION INTRAMUSCULAR; INTRAVENOUS at 18:42

## 2023-11-17 RX ADMIN — MIDAZOLAM HYDROCHLORIDE 0.5 MG: 1 INJECTION, SOLUTION INTRAMUSCULAR; INTRAVENOUS at 18:44

## 2023-11-17 RX ADMIN — FENTANYL CITRATE 25 MCG: 50 INJECTION, SOLUTION INTRAMUSCULAR; INTRAVENOUS at 18:50

## 2023-11-17 RX ADMIN — MIDAZOLAM HYDROCHLORIDE 0.5 MG: 1 INJECTION, SOLUTION INTRAMUSCULAR; INTRAVENOUS at 19:06

## 2023-11-17 RX ADMIN — DIPHENHYDRAMINE HYDROCHLORIDE 25 MG: 50 INJECTION, SOLUTION INTRAMUSCULAR; INTRAVENOUS at 18:44

## 2023-11-17 RX ADMIN — ATORVASTATIN CALCIUM 40 MG: 40 TABLET, FILM COATED ORAL at 20:43

## 2023-11-17 RX ADMIN — SODIUM CHLORIDE, PRESERVATIVE FREE 10 ML: 5 INJECTION INTRAVENOUS at 20:43

## 2023-11-17 RX ADMIN — FENTANYL CITRATE 25 MCG: 50 INJECTION, SOLUTION INTRAMUSCULAR; INTRAVENOUS at 19:26

## 2023-11-17 RX ADMIN — MIDAZOLAM HYDROCHLORIDE 0.5 MG: 1 INJECTION, SOLUTION INTRAMUSCULAR; INTRAVENOUS at 18:50

## 2023-11-17 RX ADMIN — MIDAZOLAM HYDROCHLORIDE 0.5 MG: 1 INJECTION, SOLUTION INTRAMUSCULAR; INTRAVENOUS at 19:26

## 2023-11-17 RX ADMIN — LISINOPRIL 5 MG: 10 TABLET ORAL at 09:06

## 2023-11-17 RX ADMIN — FENTANYL CITRATE 25 MCG: 50 INJECTION, SOLUTION INTRAMUSCULAR; INTRAVENOUS at 18:44

## 2023-11-17 RX ADMIN — CARVEDILOL 3.12 MG: 3.12 TABLET, FILM COATED ORAL at 09:06

## 2023-11-17 NOTE — PROGRESS NOTES
Alfred Butler RN notified IR will call when able to send for patient. Patient remains NPO, is able to sign consent.
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
## 2023-11-17 NOTE — PROCEDURES
Patient arrived to radiology department for mediport eval and treat. Allergies, medications, H&P and fasting instructions reviewed. Vital signs taken. IV flushed and prn adapter attached. Procedural instructions given, questions answered, understanding expressed and consent signed.  No questions at this time

## 2023-11-17 NOTE — PROGRESS NOTES
Power midline  Placement 11/17/2023    Product number: [id number]   Lot Number: [id number]      Ultrasound: yes   Left Basilic vein:                Upper Arm Circumference: (CM) 27cm    Size:(FR)/GUAGE 4.5frsl    Exposed Length: (CM) 3cm    Internal Length: (CM) 12cm   Cut: (CM) 0cm   Vein Measurement: 0.58cm              Lidocaine Given: yes 1%  Eric Horowitz RN  11/17/2023  11:17 AM

## 2023-11-17 NOTE — PROCEDURES
0730Spoke with pts RN regarding mediport evaluation. Notified RN that  D/T pts iodine allergy, pt will need 13 hour prep for procedure. RN stated that pts mediport is broken and needs eval.  Will speak to charge tech and radiologist and inform RN of procedure date/time. RN requested a retrun call from charge tech. Charge tech in procedure at this time, notified to call.

## 2023-11-17 NOTE — CARE COORDINATION
11/17/23 Transition of Care: Patient is admitted due to his chemotherapy port not functioning. It was placed recently at [facility]. He is to be receiving chemotherapy. Currently he is NPO and pending a CTS consult. He has IR following. Labs are pending. He is on room air. He is being treated for throat cancer. He does have a peg tube. He is alert and oriented. He is with his SO. He is a  and goes to the [facility]. He is currently being followed by [facility] in [place]. He follows with Drs are the following: Bon/Scarlett and his pharmacy is [facility] in [place]. Will follow for plan of care to replace port. Will follow for plan and needs for discharge. Electronically signed by Mary Betancur RN  on 11/17/2023 at 10:52 AM       Case Management Assessment  Initial Evaluation    Date/Time of Evaluation: 11/17/2023 10:57 AM  Assessment Completed by: Mary Betancur RN    If patient is discharged prior to next notation, then this note serves as note for discharge by case management. Patient Name: Rigoberto Cowart                   YOB: 1965  Diagnosis: Encounter for care related to vascular access port [Z45.2]  Constipation, unspecified constipation type [K59.00]  Problem with vascular access [Z78.9]                   Date / Time: 11/16/2023  2:53 AM    Patient Admission Status: Inpatient   Readmission Risk (Low < 19, Mod (19-27), High > 27): Readmission Risk Score: 12.9    Current PCP: Georgia Crockett MD  PCP verified by ? Yes    Chart Reviewed: Yes      History Provided by: Patient  Patient Orientation: Alert and Oriented    Patient Cognition: Alert    Hospitalization in the last 30 days (Readmission):  No    If yes, Readmission Assessment in  Navigator will be completed.     Advance Directives:      Code Status: Full Code   Patient's Primary Decision Maker is: Patient Declined (Legal Next of Kin Remains as Decision Maker)    Primary Decision Maker: Jossie Castanon - Domestic Partner - [phone]    Discharge Planning:    Patient lives with:   Type of Home:    Primary Care Giver: Self  Patient Support Systems include: Family Members   Current Financial resources:    Current community resources:    Current services prior to admission:              Current DME:              Type of Home Care services:       ADLS  Prior functional level: Assistance with the following:, Feeding, Mobility  Current functional level: Assistance with the following:, Feeding, Mobility    PT AM-PAC:   /24  OT AM-PAC:   /24    Family can provide assistance at DC: Yes  Would you like Case Management to discuss the discharge plan with any other family members/significant others, and if so, who?  No  Plans to Return to Present Housing: Yes  Other Identified Issues/Barriers to RETURNING to current housing: home   Potential Assistance needed at discharge:              Potential DME:    Patient expects to discharge to:    Plan for transportation at discharge:      Financial    Payor: Pari Whitley / Plan: Pari Whitley / Product Type: *No Product type* /     Does insurance require precert for SNF: Yes    Potential assistance Purchasing Medications:    Meds-to-Beds request:        [address] [facility] [address] [facility], [address] [place] [address],[address] [address]  Phone: [phone] Fax: [phone]      Notes:    Factors facilitating achievement of predicted outcomes: Family support    Barriers to discharge: Medical complications    Additional Case Management Notes: see notes     The Plan for Transition of Care is related to the following treatment goals of Encounter for care related to vascular access port [Z45.2]  Constipation, unspecified constipation type [K59.00]  Problem with vascular access [V27.4]    IF APPLICABLE: The Patient and/or patient representative Cortes Delatorre and his family were provided with a choice of provider and agrees with the discharge plan. Freedom of choice list with basic dialogue that supports the patient's individualized plan of care/goals and shares the quality data associated with the providers was provided to:     Patient Representative Name:       The Patient and/or Patient Representative Agree with the Discharge Plan?       Piedad Torres RN  Case Management Department  Ph: [phone] Fax:

## 2023-11-17 NOTE — CARE COORDINATION
11/17/23 Update CM Note: [facility] center called and admission notification given. Clinical faxed to [phone].  Electronically signed by Zoila Condon RN CM on 11/17/2023 at 11:32 AM

## 2023-11-18 VITALS
DIASTOLIC BLOOD PRESSURE: 82 MMHG | HEART RATE: 94 BPM | WEIGHT: 189.6 LBS | TEMPERATURE: 97.4 F | OXYGEN SATURATION: 97 % | RESPIRATION RATE: 16 BRPM | HEIGHT: 71 IN | SYSTOLIC BLOOD PRESSURE: 120 MMHG | BODY MASS INDEX: 26.54 KG/M2

## 2023-11-18 LAB
ANION GAP SERPL CALCULATED.3IONS-SCNC: 18 MMOL/L (ref 7–16)
BASOPHILS # BLD: 0.04 K/UL (ref 0–0.2)
BASOPHILS NFR BLD: 1 % (ref 0–2)
BUN SERPL-MCNC: 20 MG/DL (ref 6–23)
CALCIUM SERPL-MCNC: 7.8 MG/DL (ref 8.6–10.2)
CHLORIDE SERPL-SCNC: 104 MMOL/L (ref 98–107)
CO2 SERPL-SCNC: 21 MMOL/L (ref 22–29)
CREAT SERPL-MCNC: 0.9 MG/DL (ref 0.7–1.2)
EOSINOPHIL # BLD: 0 K/UL (ref 0.05–0.5)
EOSINOPHILS RELATIVE PERCENT: 0 % (ref 0–6)
ERYTHROCYTE [DISTWIDTH] IN BLOOD BY AUTOMATED COUNT: 15.2 % (ref 11.5–15)
GFR SERPL CREATININE-BSD FRML MDRD: >60 ML/MIN/1.73M2
GLUCOSE SERPL-MCNC: 68 MG/DL (ref 74–99)
HCT VFR BLD AUTO: 24.6 % (ref 37–54)
HGB BLD-MCNC: 7.8 G/DL (ref 12.5–16.5)
LYMPHOCYTES NFR BLD: 0.32 K/UL (ref 1.5–4)
LYMPHOCYTES RELATIVE PERCENT: 7 % (ref 20–42)
MCH RBC QN AUTO: 31.1 PG (ref 26–35)
MCHC RBC AUTO-ENTMCNC: 31.7 G/DL (ref 32–34.5)
MCV RBC AUTO: 98 FL (ref 80–99.9)
MONOCYTES NFR BLD: 0.48 K/UL (ref 0.1–0.95)
MONOCYTES NFR BLD: 10 % (ref 2–12)
NEUTROPHILS NFR BLD: 82 % (ref 43–80)
NEUTS SEG NFR BLD: 3.76 K/UL (ref 1.8–7.3)
PLATELET # BLD AUTO: 140 K/UL (ref 130–450)
PMV BLD AUTO: 10.3 FL (ref 7–12)
POTASSIUM SERPL-SCNC: 3.9 MMOL/L (ref 3.5–5)
RBC # BLD AUTO: 2.51 M/UL (ref 3.8–5.8)
RBC # BLD: ABNORMAL 10*6/UL
RBC # BLD: ABNORMAL 10*6/UL
SODIUM SERPL-SCNC: 143 MMOL/L (ref 132–146)
WBC OTHER # BLD: 4.6 K/UL (ref 4.5–11.5)

## 2023-11-18 PROCEDURE — 36415 COLL VENOUS BLD VENIPUNCTURE: CPT

## 2023-11-18 PROCEDURE — 2580000003 HC RX 258: Performed by: INTERNAL MEDICINE

## 2023-11-18 PROCEDURE — 6370000000 HC RX 637 (ALT 250 FOR IP): Performed by: INTERNAL MEDICINE

## 2023-11-18 PROCEDURE — 6360000002 HC RX W HCPCS: Performed by: INTERNAL MEDICINE

## 2023-11-18 PROCEDURE — 99239 HOSP IP/OBS DSCHRG MGMT >30: CPT | Performed by: INTERNAL MEDICINE

## 2023-11-18 PROCEDURE — 85025 COMPLETE CBC W/AUTO DIFF WBC: CPT

## 2023-11-18 PROCEDURE — 80048 BASIC METABOLIC PNL TOTAL CA: CPT

## 2023-11-18 RX ADMIN — SODIUM CHLORIDE, PRESERVATIVE FREE 10 ML: 5 INJECTION INTRAVENOUS at 09:17

## 2023-11-18 RX ADMIN — HEPARIN 100 UNITS: 100 SYRINGE at 09:16

## 2023-11-18 RX ADMIN — LISINOPRIL 5 MG: 10 TABLET ORAL at 09:16

## 2023-11-18 RX ADMIN — CARVEDILOL 3.12 MG: 3.12 TABLET, FILM COATED ORAL at 09:16

## 2023-11-18 NOTE — PLAN OF CARE
Problem: Chronic Conditions and Co-morbidities  Goal: Patient's chronic conditions and co-morbidity symptoms are monitored and maintained or improved  Outcome: Progressing     Problem: Safety - Adult  Goal: Free from fall injury  Outcome: Progressing     Problem: ABCDS Injury Assessment  Goal: Absence of physical injury  Outcome: Progressing     Problem: Discharge Planning  Goal: Discharge to home or other facility with appropriate resources  Outcome: Progressing     Problem: Skin/Tissue Integrity  Goal: Absence of new skin breakdown  Description: 1. Monitor for areas of redness and/or skin breakdown  2. Assess vascular access sites hourly  3. Every 4-6 hours minimum:  Change oxygen saturation probe site  4. Every 4-6 hours:  If on nasal continuous positive airway pressure, respiratory therapy assess nares and determine need for appliance change or resting period.   Outcome: Progressing     Problem: Nutrition Deficit:  Goal: Optimize nutritional status  Outcome: Progressing
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
## 2023-11-18 NOTE — BRIEF OP NOTE
Brief Postoperative Note      Patient: Radha Summers  YOB: 1965  MRN: [id number]    Date of Procedure: 11/17/2023  Neoplasm  Post-Op Diagnosis: Same       Foreign body retrieval and  placement of a new port    M Naman    Assistant:  * No surgical staff found *    Anesthesia: * moderate sedation*    Estimated Blood Loss (mL): Minimal    Complications: None    Specimens:   Port 3 components    Implants:  New 6 Fr Port      Drains:   Gastrostomy/Enterostomy/Jejunostomy Tube RLQ (Active)   Drainage Appearance Other (Comment) 11/17/23 1645   Site Description Clean;Dry; Intact 11/17/23 1645   Surrounding Skin Dry; Intact 11/17/23 1645   Dressing Status Other (Comment) 11/17/23 1645       Findings: Port , hub and catheter were successfully removed. New 6 fr Infusaport was placed. Flushed and irrigated.        Electronically signed by Héctor Pereyra MD on 11/17/2023 at 8:20 PM

## 2023-11-18 NOTE — PRE SEDATION
Sedation Pre-Procedure Note    Patient Name: Wil Acosta   YOB: 1965  Room/Bed: 9354/9875-X  Medical Record Number: [id number]  Date: 11/17/2023   Time: 8:17 PM       Indication:  Foreign Body retrieval and port placement    Consent: I have discussed with the patient and/or the patient representative the indication, alternatives, and the possible risks and/or complications of the planned procedure and the anesthesia methods. The patient and/or patient representative appear to understand and agree to proceed. Vital Signs:   Vitals:    11/17/23 2006   BP: 108/70   Pulse: 94   Resp: 17   Temp:    SpO2: 100%       Past Medical History:   has a past medical history of CAD (coronary artery disease), Cancer ([address]), Cardiac arrest ([address]), Hyperlipidemia, and STEMI (ST elevation myocardial infarction) ([address]). Past Surgical History:   has a past surgical history that includes Cardiac catheterization (9/29/15); Cardioversion (9/29/15); hernia repair; Neck surgery (Left, 6/12/2023); laryngoscopy (N/A, 6/12/2023); Mouth surgery (N/A, 7/24/2023); Tonsillectomy (Left, 7/24/2023); Mouth surgery (N/A, 7/24/2023); and laryngoscopy (N/A, 7/24/2023).     Medications:   Scheduled Meds:    lidocaine  5 mL IntraDERmal Once    sodium chloride flush  5-40 mL IntraVENous 2 times per day    heparin flush  1 mL IntraVENous 2 times per day    carvedilol  3.125 mg Oral BID WC    atorvastatin  40 mg Oral Nightly    lisinopril  5 mg Oral Daily    sodium chloride flush  5-40 mL IntraVENous 2 times per day     Continuous Infusions:    sodium chloride      sodium chloride      sodium chloride 75 mL/hr at 11/16/23 1032     PRN Meds: sodium chloride flush, sodium chloride, heparin flush, prochlorperazine, sodium chloride flush, sodium chloride, potassium chloride **OR** potassium alternative oral replacement **OR** potassium chloride, magnesium sulfate, ondansetron **OR** ondansetron, polyethylene glycol  Home Meds:   Prior to Admission yes    Electronically signed by Ashlee Mujica MD on 11/17/2023 at 8:17 PM

## 2023-11-18 NOTE — CARE COORDINATION
11/18/2023discharge order noted. Sw spoke with RN,no needs.   Electronically signed by ELHAM Mitchell on 11/18/2023 at 1:04 PM

## 2023-11-19 LAB
MICROORGANISM SPEC CULT: NORMAL
MICROORGANISM/AGENT SPEC: NORMAL
SPECIMEN DESCRIPTION: NORMAL

## 2023-11-21 LAB
MICROORGANISM SPEC CULT: ABNORMAL
MICROORGANISM SPEC CULT: ABNORMAL
MICROORGANISM SPEC CULT: NO GROWTH
MICROORGANISM SPEC CULT: NORMAL
MICROORGANISM/AGENT SPEC: ABNORMAL
SPECIMEN DESCRIPTION: ABNORMAL
SPECIMEN DESCRIPTION: NORMAL
SPECIMEN DESCRIPTION: NORMAL

## 2024-01-02 ENCOUNTER — OFFICE VISIT (OUTPATIENT)
Dept: ENT CLINIC | Age: 59
End: 2024-01-02
Payer: OTHER GOVERNMENT

## 2024-01-02 VITALS
SYSTOLIC BLOOD PRESSURE: 96 MMHG | HEART RATE: 90 BPM | HEIGHT: 71 IN | WEIGHT: 172 LBS | DIASTOLIC BLOOD PRESSURE: 72 MMHG | BODY MASS INDEX: 24.08 KG/M2

## 2024-01-02 DIAGNOSIS — C10.9 OROPHARYNGEAL CANCER (HCC): ICD-10-CM

## 2024-01-02 DIAGNOSIS — T66.XXXD RADIATION INJURY, SUBSEQUENT ENCOUNTER: Primary | ICD-10-CM

## 2024-01-02 DIAGNOSIS — M54.2 NECK PAIN: ICD-10-CM

## 2024-01-02 DIAGNOSIS — R22.1 NECK MASS: ICD-10-CM

## 2024-01-02 PROCEDURE — 3078F DIAST BP <80 MM HG: CPT | Performed by: OTOLARYNGOLOGY

## 2024-01-02 PROCEDURE — 3074F SYST BP LT 130 MM HG: CPT | Performed by: OTOLARYNGOLOGY

## 2024-01-02 PROCEDURE — 31575 DIAGNOSTIC LARYNGOSCOPY: CPT | Performed by: OTOLARYNGOLOGY

## 2024-01-02 PROCEDURE — 99214 OFFICE O/P EST MOD 30 MIN: CPT | Performed by: OTOLARYNGOLOGY

## 2024-01-02 NOTE — PROGRESS NOTES
Dear Harlan Dhillon MD Dr Gary Coleman MD    We had the pleasure of seeing Reymundo Cloud, 1965 here    on 1/2/2024  Please see below for review of care and plans.     Chief complaint-   No diagnosis found.      History of Present Illness- Presents for evaluation of left neck mass. Patient reports onset of 5/2022 of left sided neck mass that progressively enlarged over time to significant size. Not associated with pain. First waited about 2 months as it continued to grow, was seen [facility], CT scan ordered and started on cleocin without improvement. Was then seen by Dr Perez, ENT, [place], who did CT neck 7/15/22 that showed multiloculated cystic mass left level II, FNA and aspirated 15cc of fluid that was negative for organism, no tissue diagnosis. Then given Augmentin in which the mass came to a head with white purulence and then auto expressed and had yellow tinged drainage. Since it self drained the mass is significantly decreased in size and has not re accumulated. Last took antibiotics 2 weeks ago. He now has residual seeping from the previous drainage site but no further swelling like first onset.  Denies weight loss, fever, chills ,night sweats, change in voice. Nonsmoker, No etoh use. Denies recent dental work but has many dental caries that need addressed.     12/15/22: Pt here for 6 wk f/u neck mass after antibiotics. Notes less swelling since starting antibiotics.  No pain. No difficulty swallowing. No change in voice. Drinks 4-5 bottles of water, 1-2 cups of coffee, and no alcohol. Weight is stable.    1/3/23: Pt here for FNA results. No pain. No difficulty swallowing. No change in voice. Drinks 4-5 bottles of water, 1-2 cups of coffee, and no alcohol. Weight is stable.    2/21/23: Pt here to discuss surgery to remove benign neck mass. No pain, some swelling and tugging. No difficulty swallowing. No change in voice. Drinks 4-5 bottles of water, 1-2 cups of coffee, and no alcohol. Weight is

## 2024-01-02 NOTE — PROGRESS NOTES
1/2/24: Pt here for 2 mo follow up partial glossectomy w/tonsillectomy. Mentions his BP has been low and has had dizzy spell the last week. States sore on tongue has improved, but is having a pain in throat that's causing difficulty swallowing. Is utilizing PEG tube for 1.5 Jevity containers 3 times daily. No change in voice. Drinks 1.5 bottles of water, no caffeine, and no alcohol. Weight loss of 30 lbs since LOV 10/17/23.

## 2024-01-08 PROBLEM — T66.XXXA EFFECT, RADIATION: Status: ACTIVE | Noted: 2024-01-08

## 2024-01-08 PROBLEM — M54.2 NECK PAIN: Status: ACTIVE | Noted: 2022-04-20

## 2024-02-11 NOTE — PROGRESS NOTES
Dear MD Dr Claudio Morin MD    We had the pleasure of seeing Ray Speaker, 1965 here    on 4/20/2023  Please see below for review of care and plans. Chief complaint-     ICD-10-CM    1. Neck mass  R22.1       2. Neck pain  M54.2       3. 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 Patient reports onset of 5/2022 of left sided neck mass that progressively enlarged over time to significant size. Not associated with pain. First waited about 2 months as it continued to grow, was seen at [place], CT scan ordered and started on cleocin without improvement. Was then seen by Dr Karina Mendoza, ENT, [place], who did CT neck 7/15/22 that showed multiloculated cystic mass left level II, FNA and aspirated 15cc of fluid that was negative for organism, no tissue diagnosis. Then given Augmentin in which the mass came to a head with white purulence and then auto expressed and had yellow tinged drainage. Since it self drained the mass is significantly decreased in size and has not re accumulated. Last took antibiotics 2 weeks ago. He now has residual seeping from the previous drainage site but no further swelling like first onset. Denies weight loss, fever, chills ,night sweats, change in voice. Nonsmoker, No etoh use. Denies recent dental work but has many dental caries that need addressed. 12/15/22: Pt here for 6 wk f/u neck mass after antibiotics. Notes less swelling since starting antibiotics. No pain. No difficulty swallowing. No change in voice. Drinks 4-5 bottles of water, 1-2 cups of coffee, and no alcohol. Weight is stable. 1/3/23: Pt here for FNA results. No pain. No difficulty swallowing. No change in voice. Drinks 4-5 bottles of water, 1-2 cups of coffee, and no alcohol. Weight is stable. 2/21/23: Pt here to discuss surgery to remove benign neck mass. No pain, some swelling and tugging.  No numerical 0-10

## 2024-02-15 ENCOUNTER — TELEPHONE (OUTPATIENT)
Dept: ENT CLINIC | Age: 59
End: 2024-02-15

## 2024-02-27 ENCOUNTER — OFFICE VISIT (OUTPATIENT)
Dept: ENT CLINIC | Age: 59
End: 2024-02-27
Payer: OTHER GOVERNMENT

## 2024-02-27 VITALS
HEART RATE: 88 BPM | DIASTOLIC BLOOD PRESSURE: 76 MMHG | WEIGHT: 163 LBS | SYSTOLIC BLOOD PRESSURE: 106 MMHG | HEIGHT: 71 IN | BODY MASS INDEX: 22.82 KG/M2

## 2024-02-27 DIAGNOSIS — T66.XXXD RADIATION INJURY, SUBSEQUENT ENCOUNTER: ICD-10-CM

## 2024-02-27 DIAGNOSIS — R13.12 OROPHARYNGEAL DYSPHAGIA: Primary | ICD-10-CM

## 2024-02-27 DIAGNOSIS — K13.79 MOUTH PAIN: ICD-10-CM

## 2024-02-27 DIAGNOSIS — M95.3 ACQUIRED DEFORMITY OF NECK: ICD-10-CM

## 2024-02-27 DIAGNOSIS — C10.9 OROPHARYNGEAL CANCER (HCC): ICD-10-CM

## 2024-02-27 PROCEDURE — 3078F DIAST BP <80 MM HG: CPT | Performed by: OTOLARYNGOLOGY

## 2024-02-27 PROCEDURE — 3074F SYST BP LT 130 MM HG: CPT | Performed by: OTOLARYNGOLOGY

## 2024-02-27 PROCEDURE — 31575 DIAGNOSTIC LARYNGOSCOPY: CPT | Performed by: OTOLARYNGOLOGY

## 2024-02-27 PROCEDURE — 99214 OFFICE O/P EST MOD 30 MIN: CPT | Performed by: OTOLARYNGOLOGY

## 2024-02-27 NOTE — PROGRESS NOTES
2/27/24: Pt here for 6 wk follow up partial glossectomy/tonsilectomy. Has a soreness in mouth, made worse with certain foods. Some difficulty swallowing certain consistencies. No change in voice. Drinks 1.5 bottles of water, no caffeine, and no alcohol. Weight has been fluctuating lately.

## 2024-02-27 NOTE — PROGRESS NOTES
Dear Harlan Dhillon MD Dr Gary Coleman MD    We had the pleasure of seeing Reymundo Cloud, 1965 here    on 2/27/2024  Please see below for review of care and plans.     Chief complaint-   No diagnosis found.      History of Present Illness- Presents for evaluation of left neck mass. Patient reports onset of 5/2022 of left sided neck mass that progressively enlarged over time to significant size. Not associated with pain. First waited about 2 months as it continued to grow, was seen at [facility], CT scan ordered and started on cleocin without improvement. Was then seen by Dr Perez, ENT, [place], who did CT neck 7/15/22 that showed multiloculated cystic mass left level II, FNA and aspirated 15cc of fluid that was negative for organism, no tissue diagnosis. Then given Augmentin in which the mass came to a head with white purulence and then auto expressed and had yellow tinged drainage. Since it self drained the mass is significantly decreased in size and has not re accumulated. Last took antibiotics 2 weeks ago. He now has residual seeping from the previous drainage site but no further swelling like first onset.  Denies weight loss, fever, chills ,night sweats, change in voice. Nonsmoker, No etoh use. Denies recent dental work but has many dental caries that need addressed.     12/15/22: Pt here for 6 wk f/u neck mass after antibiotics. Notes less swelling since starting antibiotics.  No pain. No difficulty swallowing. No change in voice. Drinks 4-5 bottles of water, 1-2 cups of coffee, and no alcohol. Weight is stable.    1/3/23: Pt here for FNA results. No pain. No difficulty swallowing. No change in voice. Drinks 4-5 bottles of water, 1-2 cups of coffee, and no alcohol. Weight is stable.    2/21/23: Pt here to discuss surgery to remove benign neck mass. No pain, some swelling and tugging. No difficulty swallowing. No change in voice. Drinks 4-5 bottles of water, 1-2 cups of coffee, and no alcohol. Weight is

## 2024-04-11 DIAGNOSIS — I21.02 ST ELEVATION MYOCARDIAL INFARCTION INVOLVING LEFT ANTERIOR DESCENDING (LAD) CORONARY ARTERY (HCC): ICD-10-CM

## 2024-04-11 DIAGNOSIS — I24.9 ACUTE CORONARY SYNDROME (HCC): ICD-10-CM

## 2024-04-11 DIAGNOSIS — I46.9 CARDIAC ARREST (HCC): ICD-10-CM

## 2024-04-11 RX ORDER — LISINOPRIL 5 MG/1
TABLET ORAL
Qty: 90 TABLET | Refills: 0 | Status: SHIPPED | OUTPATIENT
Start: 2024-04-11

## 2024-04-11 RX ORDER — CARVEDILOL 3.12 MG/1
3.12 TABLET ORAL 2 TIMES DAILY WITH MEALS
Qty: 180 TABLET | Refills: 0 | Status: SHIPPED | OUTPATIENT
Start: 2024-04-11

## 2024-04-25 ENCOUNTER — OFFICE VISIT (OUTPATIENT)
Dept: ENT CLINIC | Age: 59
End: 2024-04-25
Payer: OTHER GOVERNMENT

## 2024-04-25 VITALS
DIASTOLIC BLOOD PRESSURE: 74 MMHG | HEART RATE: 80 BPM | BODY MASS INDEX: 23.24 KG/M2 | HEIGHT: 71 IN | WEIGHT: 166 LBS | SYSTOLIC BLOOD PRESSURE: 107 MMHG

## 2024-04-25 DIAGNOSIS — C10.9 OROPHARYNGEAL CANCER (HCC): ICD-10-CM

## 2024-04-25 DIAGNOSIS — R49.0 DYSPHONIA: ICD-10-CM

## 2024-04-25 DIAGNOSIS — T66.XXXD RADIATION INJURY, SUBSEQUENT ENCOUNTER: Primary | ICD-10-CM

## 2024-04-25 DIAGNOSIS — R13.12 OROPHARYNGEAL DYSPHAGIA: ICD-10-CM

## 2024-04-25 DIAGNOSIS — M95.3 ACQUIRED DEFORMITY OF NECK: ICD-10-CM

## 2024-04-25 PROCEDURE — 99214 OFFICE O/P EST MOD 30 MIN: CPT | Performed by: OTOLARYNGOLOGY

## 2024-04-25 PROCEDURE — 3078F DIAST BP <80 MM HG: CPT | Performed by: OTOLARYNGOLOGY

## 2024-04-25 PROCEDURE — 31575 DIAGNOSTIC LARYNGOSCOPY: CPT | Performed by: OTOLARYNGOLOGY

## 2024-04-25 PROCEDURE — 3074F SYST BP LT 130 MM HG: CPT | Performed by: OTOLARYNGOLOGY

## 2024-04-25 NOTE — PROGRESS NOTES
4/25/24: Pt here for 6 wk follow up partial glossectomy/tonsillectomy. Having burning pain at PEG site, states had spicy soup yesterday. Swallowing has not improved since last office visit. No change in voice. Drinks 1.5 bottles of water, no caffeine, and no alcohol. Weight has been fluctuating recently but has started working on gaining again.  
pharyngeal wall and tonsils.               Scope 4/25/24  Procedure note- after pt verbally consented, was sprayed nasally with 1:1 neosynephrine and xylocaine. Scope was passed and found nasal cavity with no lesion. np clear, tonsil wnl, tongue mobile and no masses, vocal cords mobile bilaterally with full ab and ad duction. Hypopharynx clear, open and no masses. + xrt changes to base of tongue and pharyngeal wall and tonsils with thickened mucus but no signs malignancy              Medical Decision Making and Treatment Plan.  Hx of neck abscess that autodrained with improvement- differential includes abscess from dental origin, infected branchial cleft cyst, less likely malignancy -done  Discussed he needs to be evaluated by dentist for evaluation of dental extractions, referral placed -done  Asked pt to get the CD of his previous CT scans and bring to office for review -done  If persists after dental evaluation, will consider repeat CT scan at next visit -done  Will start on PCN V for possible actinomyces/dental infection- done, no more needed  Follow up in 4-6 weeks for re-evaluation, discussed signs and symptoms on which to return sooner- still with mass present.  Not draining anymore  D/w pt and family regarding txment of neck mass and concern if just slowly resolving abscess vs an occult malignancy.  Surgical excision could be an option to just remove it to eval for occult malignancy vs removal of abscess walled off.    Pt wishes to think about his options and will further discuss at next visit.    Pt wishes for it to be removed as it is painful and worried about recurrance or mallignancy.  may scope at next visit - done- fullness of left bot  Ct neck with contrast ordered to better eval mass of tongue and any changes in neck to plan for surgery- extent of surgery and whether panendo with tongue biopsy would be warranted.  Ct with changes in left neck but no overt uadt mass or changes. -done  Recommend

## 2024-05-07 ENCOUNTER — TELEPHONE (OUTPATIENT)
Dept: ENT CLINIC | Age: 59
End: 2024-05-07

## 2024-05-08 ENCOUNTER — TELEPHONE (OUTPATIENT)
Dept: ENT CLINIC | Age: 59
End: 2024-05-08

## 2024-05-08 NOTE — TELEPHONE ENCOUNTER
[facility] sent a RFS form that needs filled out and faxed back to [phone]. Form is in Media.

## 2024-06-04 RX ORDER — ATORVASTATIN CALCIUM 80 MG/1
80 TABLET, FILM COATED ORAL NIGHTLY
Qty: 30 TABLET | Refills: 3 | Status: SHIPPED | OUTPATIENT
Start: 2024-06-04

## 2024-06-13 ENCOUNTER — OFFICE VISIT (OUTPATIENT)
Dept: CARDIOLOGY CLINIC | Age: 59
End: 2024-06-13
Payer: OTHER GOVERNMENT

## 2024-06-13 VITALS
HEIGHT: 71 IN | HEART RATE: 71 BPM | DIASTOLIC BLOOD PRESSURE: 83 MMHG | WEIGHT: 164.4 LBS | RESPIRATION RATE: 16 BRPM | SYSTOLIC BLOOD PRESSURE: 114 MMHG | BODY MASS INDEX: 23.02 KG/M2

## 2024-06-13 DIAGNOSIS — I25.10 CORONARY ARTERY DISEASE INVOLVING NATIVE CORONARY ARTERY OF NATIVE HEART WITHOUT ANGINA PECTORIS: Primary | ICD-10-CM

## 2024-06-13 DIAGNOSIS — I95.2 HYPOTENSION DUE TO DRUGS: ICD-10-CM

## 2024-06-13 DIAGNOSIS — Z86.74 HISTORY OF CARDIAC ARREST: ICD-10-CM

## 2024-06-13 DIAGNOSIS — I10 PRIMARY HYPERTENSION: ICD-10-CM

## 2024-06-13 DIAGNOSIS — I25.2 HISTORY OF ST ELEVATION MYOCARDIAL INFARCTION (STEMI): ICD-10-CM

## 2024-06-13 PROCEDURE — 3074F SYST BP LT 130 MM HG: CPT | Performed by: INTERNAL MEDICINE

## 2024-06-13 PROCEDURE — 99213 OFFICE O/P EST LOW 20 MIN: CPT | Performed by: INTERNAL MEDICINE

## 2024-06-13 PROCEDURE — 93000 ELECTROCARDIOGRAM COMPLETE: CPT | Performed by: INTERNAL MEDICINE

## 2024-06-13 PROCEDURE — 3079F DIAST BP 80-89 MM HG: CPT | Performed by: INTERNAL MEDICINE

## 2024-06-13 NOTE — PROGRESS NOTES
Date    INR 1.2 11/15/2023    INR 1.0 2015    PROTIME 13.3 (H) 11/15/2023    PROTIME 10.7 2015     Lab Results   Component Value Date    TSH 1.080 10/01/2015     Lab Results   Component Value Date    LABA1C 5.7 2015     No results found for: \"EAG\"  Lab Results   Component Value Date    CHOL 200 (H) 2015     Lab Results   Component Value Date    TRIG 178 (H) 2015     Lab Results   Component Value Date    HDL 26 2015     No components found for: \"LDLCALC\", \"LDLCHOLESTEROL\"  Lab Results   Component Value Date    VLDL 36 2015     No results found for: \"CHOLHDLRATIO\"  No results for input(s): \"PROBNP\" in the last 72 hours.    Cardiac Tests:  EC2024: Sinus rhythm 71 beats minute.  Normal axis and intervals.  No ST or T wave changes.    Echocardiogram:       Echocardiogram 10/05/2015, Dr. Castro. EF 65%. Stage 1 diastolic dysfunction. Otherwise, no significant valvulopathy.     Stress test:    Regadenoson SPECT MPI 2023           The stress and resting images show normal perfusion.       No scintigraphic evidence for myocardial ischemia or scar.    Cardiac catheterization:   Emergent left heart catheterization, 2015, right groin approach. Left main 0%, LAD, mid 99% with HAIM 1 flow distally followed by 60% head bifurcation of D1; left circumflex, mild diffuse disease RCA dominant, 50-60% mild stenosis with mild disease in the RPDA and LPCA. LV angio, anterolateral and apical hypokinesis. EF 40-45%. LVEDP 16 mmHg.  PCI to the mid LAD with 4.0 x 18 mm Xience Alphine drug-eluting stent dilated with noncompliant balloon to 4.5 mm.    Orders Placed This Encounter   Procedures    EKG 12 lead        Requested Prescriptions      No prescriptions requested or ordered in this encounter        ASSESSMENT / PLAN:  Hypotension, due to significant weight loss and antihypertensive drugs  Coronary artery disease anterior STEMI  complicated byVF arrest s/p LAD PCI  Anemia,

## 2024-06-18 ENCOUNTER — TELEPHONE (OUTPATIENT)
Dept: ENT CLINIC | Age: 59
End: 2024-06-18

## 2024-06-19 NOTE — TELEPHONE ENCOUNTER
Called patient SERENITY on VM to return call to confirm he got message that appt has been cancelled

## 2024-07-05 ENCOUNTER — OFFICE VISIT (OUTPATIENT)
Dept: ENT CLINIC | Age: 59
End: 2024-07-05
Payer: OTHER GOVERNMENT

## 2024-07-05 VITALS
WEIGHT: 163.5 LBS | HEART RATE: 65 BPM | BODY MASS INDEX: 22.89 KG/M2 | DIASTOLIC BLOOD PRESSURE: 74 MMHG | SYSTOLIC BLOOD PRESSURE: 108 MMHG | HEIGHT: 71 IN

## 2024-07-05 DIAGNOSIS — R13.12 OROPHARYNGEAL DYSPHAGIA: ICD-10-CM

## 2024-07-05 DIAGNOSIS — C10.9 OROPHARYNGEAL CANCER (HCC): Primary | ICD-10-CM

## 2024-07-05 DIAGNOSIS — T66.XXXA RADIATION INJURY, INITIAL ENCOUNTER: ICD-10-CM

## 2024-07-05 PROCEDURE — 3078F DIAST BP <80 MM HG: CPT | Performed by: OTOLARYNGOLOGY

## 2024-07-05 PROCEDURE — 31575 DIAGNOSTIC LARYNGOSCOPY: CPT | Performed by: OTOLARYNGOLOGY

## 2024-07-05 PROCEDURE — 99213 OFFICE O/P EST LOW 20 MIN: CPT | Performed by: OTOLARYNGOLOGY

## 2024-07-05 PROCEDURE — 3074F SYST BP LT 130 MM HG: CPT | Performed by: OTOLARYNGOLOGY

## 2024-07-05 NOTE — PROGRESS NOTES
[facility]  Dr. Jake Romero D.O. Ms.Ed        Patient Name:  Reymundo Cloud  :  1965     CHIEF C/O:    Chief Complaint   Patient presents with    Follow-up     6 week f/u partial glossectomy/tonsillectomy SCOPE       HISTORY OBTAINED FROM:  patient    HISTORY OF PRESENT ILLNESS:       Reymundo is a 59 y.o. year old male, here today for follow up of:       Patient is here for follow-up, previous patient of Dr. 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  Patient currently is stable, with no new complaints of difficulty swallowing no complaints of shortness of breath or stridor no complaints of new neck swelling at this time.  He does have intermittent episodes of mucus production, he denies any sort of neck tenderness.           Past Medical History:   Diagnosis Date    CAD (coronary artery disease)     Cancer (HCC)     Cardiac arrest (HCC)     Hyperlipidemia     STEMI (ST elevation myocardial infarction) (HCC)      Past Surgical History:   Procedure Laterality Date    CARDIAC CATHETERIZATION  9/29/15    LHC/PCI w/JENNIFFER to LAD    CARDIOVERSION  9/29/15    HERNIA REPAIR      umbilical  age 13    IR PORT PLACEMENT EQUAL OR GREATER THAN 5 YEARS  2023    IR PORT PLACEMENT EQUAL OR GREATER THAN 5 YEARS 2023 DAMIAN Florence MD [facility]    IR TRANSCATHETER RETRIEVAL FB  2023    IR TRANSCATHETER RETRIEVAL FB 2023 DAMIAN Florence MD [facility]    LARYNGOSCOPY N/A 2023    DIRECT LARYNGOSCOPY performed by Dane Devlin MD at [facility]    LARYNGOSCOPY N/A 2023    LARYNGOSCOPY performed by Dane Devlin MD at [facility]    MOUTH SURGERY N/A 2023    TRANSORAL SURGERY ROBOTIC, RADICAL TONSILLECTOMY,  PARTIAL GLOSSECTOMY, PARTIAL PHARYNGECTOMY performed by Dane Devlin MD at

## 2024-08-28 ENCOUNTER — OFFICE VISIT (OUTPATIENT)
Dept: CARDIOLOGY CLINIC | Age: 59
End: 2024-08-28
Payer: OTHER GOVERNMENT

## 2024-08-28 VITALS
HEART RATE: 70 BPM | DIASTOLIC BLOOD PRESSURE: 68 MMHG | BODY MASS INDEX: 24.62 KG/M2 | RESPIRATION RATE: 18 BRPM | HEIGHT: 70 IN | WEIGHT: 172 LBS | SYSTOLIC BLOOD PRESSURE: 102 MMHG

## 2024-08-28 DIAGNOSIS — I24.9 ACUTE CORONARY SYNDROME (HCC): Primary | ICD-10-CM

## 2024-08-28 PROCEDURE — 3078F DIAST BP <80 MM HG: CPT | Performed by: INTERNAL MEDICINE

## 2024-08-28 PROCEDURE — 99214 OFFICE O/P EST MOD 30 MIN: CPT | Performed by: INTERNAL MEDICINE

## 2024-08-28 PROCEDURE — 93000 ELECTROCARDIOGRAM COMPLETE: CPT | Performed by: INTERNAL MEDICINE

## 2024-08-28 PROCEDURE — 3074F SYST BP LT 130 MM HG: CPT | Performed by: INTERNAL MEDICINE

## 2024-08-28 NOTE — PROGRESS NOTES
CHIEF COMPLAINT: CAD    HISTORY OF PRESENT ILLNESS: Patient is a 59 y.o. male seen at the request of Harlan Crockett MD.      No CP or SOB.     Past history includes:  Obesity. BMI: 32.  No known drug allergies.  Never smoked.  No family history of premature CAD.  No history of hypertension, diabetes, CVA or TIA.  Presentation ELCH on 09/29/2015 with acute onset substernal chest pain and VF arrest. CPR followed by defibrillation x 3. Intubated in the ED. EKG concerning for acute anterolateral STEMI.  Emergent left heart catheterization, 9/29/2015, right groin approach. Left main 0%, LAD, mid 99% with HAIM 1 flow distally followed by 60% head bifurcation of D1; left circumflex, mild diffuse disease RCA dominant, 50-60% mild stenosis with mild disease in the RPDA and LPCA. LV angio, anterolateral and apical hypokinesis. EF 40-45%. LVEDP 16 mmHg.  PCI to the mid LAD with 4.0 x 18 mm Xience Alphine drug-eluting stent dilated with noncompliant balloon to 4.5 mm.  Echocardiogram 10/05/2015, Dr. Castro. EF 65%. Stage 1 diastolic dysfunction. Otherwise, no significant valvulopathy.    Past Medical History:   Diagnosis Date    CAD (coronary artery disease)     Cancer ([facility])     Cardiac arrest ([facility])     Hyperlipidemia     STEMI (ST elevation myocardial infarction) ([facility])        Patient Active Problem List   Diagnosis    Acute coronary syndrome (HCC)    Old myocardial infarction    Diabetes mellitus type 2, uncontrolled    Cardiac arrest ([facility])    Presence of drug coated stent in anterior descending branch of left coronary artery    Anxiety state    Atherosclerotic heart disease of native coronary artery without angina pectoris    Benign essential hypertension    Combined forms of age-related cataract, bilateral    Dizziness and giddiness    Gastroesophageal reflux disease    Hyperlipidemia    Localized enlarged lymph nodes    Neck pain    Occlusion and stenosis of bilateral carotid arteries    Occult blood in stools

## 2024-09-24 ENCOUNTER — TELEPHONE (OUTPATIENT)
Dept: ENT CLINIC | Age: 59
End: 2024-09-24

## 2024-09-24 DIAGNOSIS — C10.9 OROPHARYNGEAL CANCER (HCC): Primary | ICD-10-CM

## 2024-10-04 RX ORDER — ATORVASTATIN CALCIUM 80 MG/1
80 TABLET, FILM COATED ORAL NIGHTLY
Qty: 90 TABLET | Refills: 3 | Status: SHIPPED | OUTPATIENT
Start: 2024-10-04

## 2024-11-05 DIAGNOSIS — I46.9 CARDIAC ARREST: ICD-10-CM

## 2024-11-05 DIAGNOSIS — I21.02 ST ELEVATION MYOCARDIAL INFARCTION INVOLVING LEFT ANTERIOR DESCENDING (LAD) CORONARY ARTERY (HCC): ICD-10-CM

## 2024-11-05 DIAGNOSIS — I24.9 ACUTE CORONARY SYNDROME (HCC): ICD-10-CM

## 2024-11-05 RX ORDER — CARVEDILOL 3.12 MG/1
3.12 TABLET ORAL 2 TIMES DAILY WITH MEALS
Qty: 180 TABLET | Refills: 3 | Status: SHIPPED | OUTPATIENT
Start: 2024-11-05

## 2025-07-28 ENCOUNTER — TELEPHONE (OUTPATIENT)
Dept: CARDIOLOGY CLINIC | Age: 60
End: 2025-07-28

## 2025-08-18 ENCOUNTER — OFFICE VISIT (OUTPATIENT)
Dept: CARDIOLOGY CLINIC | Age: 60
End: 2025-08-18
Payer: OTHER GOVERNMENT

## 2025-08-18 VITALS
TEMPERATURE: 97.3 F | DIASTOLIC BLOOD PRESSURE: 66 MMHG | HEART RATE: 63 BPM | HEIGHT: 71 IN | RESPIRATION RATE: 18 BRPM | BODY MASS INDEX: 27.22 KG/M2 | SYSTOLIC BLOOD PRESSURE: 122 MMHG | WEIGHT: 194.4 LBS | OXYGEN SATURATION: 90 %

## 2025-08-18 DIAGNOSIS — I21.02 ST ELEVATION MYOCARDIAL INFARCTION INVOLVING LEFT ANTERIOR DESCENDING (LAD) CORONARY ARTERY (HCC): ICD-10-CM

## 2025-08-18 DIAGNOSIS — I46.9 CARDIAC ARREST (HCC): ICD-10-CM

## 2025-08-18 DIAGNOSIS — I24.9 ACUTE CORONARY SYNDROME (HCC): Primary | ICD-10-CM

## 2025-08-18 DIAGNOSIS — I48.0 PAF (PAROXYSMAL ATRIAL FIBRILLATION) (HCC): ICD-10-CM

## 2025-08-18 PROCEDURE — 93000 ELECTROCARDIOGRAM COMPLETE: CPT | Performed by: INTERNAL MEDICINE

## 2025-08-18 PROCEDURE — 3078F DIAST BP <80 MM HG: CPT | Performed by: INTERNAL MEDICINE

## 2025-08-18 PROCEDURE — 99214 OFFICE O/P EST MOD 30 MIN: CPT | Performed by: INTERNAL MEDICINE

## 2025-08-18 PROCEDURE — G2211 COMPLEX E/M VISIT ADD ON: HCPCS | Performed by: INTERNAL MEDICINE

## 2025-08-18 PROCEDURE — 3074F SYST BP LT 130 MM HG: CPT | Performed by: INTERNAL MEDICINE

## 2025-08-18 RX ORDER — CARVEDILOL 3.12 MG/1
3.12 TABLET ORAL 2 TIMES DAILY WITH MEALS
Qty: 180 TABLET | Refills: 3 | Status: SHIPPED | OUTPATIENT
Start: 2025-08-18

## 2025-08-18 RX ORDER — ATORVASTATIN CALCIUM 80 MG/1
80 TABLET, FILM COATED ORAL NIGHTLY
Qty: 90 TABLET | Refills: 3 | Status: SHIPPED | OUTPATIENT
Start: 2025-08-18

## (undated) DEVICE — CONTAINER SPEC ANAERB VACTNR

## (undated) DEVICE — MICRODISSECTION NEEDLE STRAIGHT SLEEVE: Brand: COLORADO

## (undated) DEVICE — TONSIL SPONGES: Brand: DEROYAL

## (undated) DEVICE — SPONGE LAP W18XL18IN WHT COT 4 PLY FLD STRUNG RADPQ DISP ST 2 PER PACK

## (undated) DEVICE — SYRINGE,EAR/ULCER, 3 OZ, STERILE: Brand: MEDLINE

## (undated) DEVICE — TUBING, SUCTION, 3/16" X 12', STRAIGHT: Brand: MEDLINE

## (undated) DEVICE — Device

## (undated) DEVICE — DRAIN SURG W10MMXL20CM SIL FULL PERF HUBLESS FLAT RADPQ

## (undated) DEVICE — KIT SURG W7XL11IN 2 PKT UNTREATED NA

## (undated) DEVICE — APPLIER LIG CLP M L11IN TI STR RNG HNDL FOR 20 CLP DISP

## (undated) DEVICE — EVAC 70 XTRA WAND: Brand: COBLATION

## (undated) DEVICE — CANNULA SEAL

## (undated) DEVICE — HEAD AND NECK: Brand: MEDLINE INDUSTRIES, INC.

## (undated) DEVICE — AMBU ASCOPE 4 RHINOLARYNGO SLIM SINGLE-USE, FLEXIBLE RHINOLARYNGOSCOPE STERILE FROM PACKAGE. INSERTION CORD DIAMETER 3.0 MM, LENGHT OF 300 MM. AND A 130-DEGREE BENDING ANGLE. MINIMUM PURCHASE 5 PCS = 1 BOX: Brand: ASCOPE™ 4 RHINOLARYNGO SLIM

## (undated) DEVICE — KIT,ANTI FOG,W/SPONGE & FLUID,SOFT PACK: Brand: MEDLINE

## (undated) DEVICE — DRAPE,REIN 53X77,STERILE: Brand: MEDLINE

## (undated) DEVICE — YANKAUER,OPEN TIP,W/O VENT,STERILE: Brand: MEDLINE INDUSTRIES, INC.

## (undated) DEVICE — EXTRACTOR STPL L10CM REMV SKIN CLSR STPL SQUEEZE HNDL PROX

## (undated) DEVICE — PAD,NON-ADHERENT,2X3,STERILE,LF,1/PK: Brand: MEDLINE

## (undated) DEVICE — GLOVE ORANGE PI 7 1/2   MSG9075

## (undated) DEVICE — SWAB SPEC COLL SHFT L5.25IN POLYUR FOAM TIP SFT DBL MEDIA

## (undated) DEVICE — SYRINGE MED 10ML LUERLOCK TIP W/O SFTY DISP

## (undated) DEVICE — TOWEL,OR,DSP,ST,BLUE,STD,6/PK,12PK/CS: Brand: MEDLINE

## (undated) DEVICE — OPTIFOAM GENTLE EX, SACRUM, 9X9: Brand: MEDLINE

## (undated) DEVICE — ELECTRODE PT RET AD L9FT HI MOIST COND ADH HYDRGEL CORDED

## (undated) DEVICE — NEEDLE SYR 18GA L1.5IN RED PLAS HUB S STL BLNT FILL W/O

## (undated) DEVICE — GOWN,SIRUS,FABRNF,XL,20/CS: Brand: MEDLINE

## (undated) DEVICE — GARMENT,MEDLINE,DVT,INT,CALF,MED, GEN2: Brand: MEDLINE

## (undated) DEVICE — GLOVE SURG SZ 65 THK91MIL LTX FREE SYN POLYISOPRENE

## (undated) DEVICE — ARM DRAPE

## (undated) DEVICE — BLADE,STAINLESS-STEEL,15,STRL,DISPOSABLE: Brand: MEDLINE

## (undated) DEVICE — 1000 S-DRAPE TOWEL DRAPE 10/BX: Brand: STERI-DRAPE™

## (undated) DEVICE — TIP COVER ACCESSORY

## (undated) DEVICE — DUAL LUMEN STOMACH TUBE: Brand: SALEM SUMP

## (undated) DEVICE — DRAPE THER FLUID WARMING 66X44 IN FLAT SLUSH DBL DISC ORS

## (undated) DEVICE — DRAIN SURG W7MMXL20CM SIL FULL PERF HUBLESS FLAT RADPQ STRP

## (undated) DEVICE — HOOK RETRCT 12MM S STL BLNT E STAY LONE STAR

## (undated) DEVICE — SOLUTION IRRIG 1000ML 0.9% SOD CHL USP POUR PLAS BTL

## (undated) DEVICE — SOLUTION IV IRRIG 500ML 0.9% SODIUM CHL 2F7123

## (undated) DEVICE — PERMANENT CAUTERY SPATULA: Brand: ENDOWRIST

## (undated) DEVICE — MARKER,SKIN,WI/RULER AND LABELS: Brand: MEDLINE

## (undated) DEVICE — MASTISOL ADHESIVE LIQ 2/3ML

## (undated) DEVICE — BLANKET WRM W35.4XL86.6IN FULL UNDERBODY + FORC AIR

## (undated) DEVICE — BAG PRSS INFUS IV OR ART 3000 CC W STPCOCK NO THUMBWHEEL W

## (undated) DEVICE — COLUMN DRAPE

## (undated) DEVICE — PROBE 8225101 5PK STD PRASS FL TIP ROHS

## (undated) DEVICE — SPONGE,PEANUT,XRAY,ST,SM,3/8",5/CARD: Brand: MEDLINE INDUSTRIES, INC.

## (undated) DEVICE — PREMIUM WET SKIN PREP TRAY: Brand: MEDLINE INDUSTRIES, INC.

## (undated) DEVICE — PAPER FILTER 1 32CM

## (undated) DEVICE — MARKER SURG SKIN FULL SZ PUR TRAD INK REGULAR ULTRA FN TWIN

## (undated) DEVICE — MARKER SURG MARGIN STD 6 CLR INK ASST CORR-MIN ORDER 6 EACH

## (undated) DEVICE — CORD,CAUTERY,BIPOLAR,STERILE: Brand: MEDLINE

## (undated) DEVICE — ELECTRODE 8227411 PAIRED 4 CH SET ROHS

## (undated) DEVICE — CATHETER,URETHRAL,REDRUBBER,STRL,12FR: Brand: MEDLINE INDUSTRIES, INC.

## (undated) DEVICE — STRIP,CLOSURE,WOUND,MEDI-STRIP,1/2X4: Brand: MEDLINE